# Patient Record
Sex: MALE | Race: WHITE | NOT HISPANIC OR LATINO | URBAN - METROPOLITAN AREA
[De-identification: names, ages, dates, MRNs, and addresses within clinical notes are randomized per-mention and may not be internally consistent; named-entity substitution may affect disease eponyms.]

---

## 2017-02-08 ENCOUNTER — FOLLOW UP (OUTPATIENT)
Dept: URBAN - METROPOLITAN AREA CLINIC 19 | Facility: CLINIC | Age: 62
End: 2017-02-08

## 2017-02-08 DIAGNOSIS — E11.3313: ICD-10-CM

## 2017-02-08 DIAGNOSIS — D31.31: ICD-10-CM

## 2017-02-08 DIAGNOSIS — H25.13: ICD-10-CM

## 2017-02-08 DIAGNOSIS — Z79.4: ICD-10-CM

## 2017-02-08 PROCEDURE — 92134 CPTRZ OPH DX IMG PST SGM RTA: CPT

## 2017-02-08 PROCEDURE — 67028 INJECTION EYE DRUG: CPT

## 2017-02-08 PROCEDURE — 92226 OPHTHALMOSCOPY (SUB): CPT

## 2017-02-08 PROCEDURE — 92014 COMPRE OPH EXAM EST PT 1/>: CPT | Mod: 25

## 2017-02-08 ASSESSMENT — VISUAL ACUITY
OD_SC: 20/50-1
OS_SC: 20/40+3
OS_PH: 20/25
OD_PH: 20/40

## 2017-02-08 ASSESSMENT — TONOMETRY
OD_IOP_MMHG: 19
OS_IOP_MMHG: 12

## 2017-03-12 ENCOUNTER — RX RENEWAL (OUTPATIENT)
Age: 62
End: 2017-03-12

## 2017-03-26 ENCOUNTER — RX RENEWAL (OUTPATIENT)
Age: 62
End: 2017-03-26

## 2017-03-27 ENCOUNTER — RX RENEWAL (OUTPATIENT)
Age: 62
End: 2017-03-27

## 2017-04-05 ENCOUNTER — FOLLOW UP (OUTPATIENT)
Dept: URBAN - METROPOLITAN AREA CLINIC 19 | Facility: CLINIC | Age: 62
End: 2017-04-05

## 2017-04-05 DIAGNOSIS — Z79.4: ICD-10-CM

## 2017-04-05 DIAGNOSIS — E11.3313: ICD-10-CM

## 2017-04-05 DIAGNOSIS — D31.31: ICD-10-CM

## 2017-04-05 PROCEDURE — 67028 INJECTION EYE DRUG: CPT

## 2017-04-05 PROCEDURE — 92134 CPTRZ OPH DX IMG PST SGM RTA: CPT

## 2017-04-05 PROCEDURE — 2022F DILAT RTA XM EVC RTNOPTHY: CPT

## 2017-04-05 PROCEDURE — 92014 COMPRE OPH EXAM EST PT 1/>: CPT | Mod: 25

## 2017-04-05 PROCEDURE — G8397 DIL MACULA/FUNDUS EXAM/W DOC: HCPCS

## 2017-04-05 PROCEDURE — 1036F TOBACCO NON-USER: CPT

## 2017-04-05 PROCEDURE — 92226 OPHTHALMOSCOPY (SUB): CPT

## 2017-04-05 PROCEDURE — 2021F DILAT MACULAR EXAM DONE: CPT

## 2017-04-05 PROCEDURE — 5010F MACUL RESULT PHY/QHP MNG DM: CPT

## 2017-04-05 ASSESSMENT — TONOMETRY
OS_IOP_MMHG: 15
OD_IOP_MMHG: 17

## 2017-04-05 ASSESSMENT — VISUAL ACUITY
OS_SC: 20/30
OD_SC: 20/50
OD_PH: 20/40

## 2017-04-10 ENCOUNTER — RX RENEWAL (OUTPATIENT)
Age: 62
End: 2017-04-10

## 2017-05-25 ENCOUNTER — FOLLOW UP (OUTPATIENT)
Dept: URBAN - METROPOLITAN AREA CLINIC 19 | Facility: CLINIC | Age: 62
End: 2017-05-25

## 2017-05-25 DIAGNOSIS — D31.31: ICD-10-CM

## 2017-05-25 DIAGNOSIS — Z79.4: ICD-10-CM

## 2017-05-25 DIAGNOSIS — E11.3313: ICD-10-CM

## 2017-05-25 PROCEDURE — 5010F MACUL RESULT PHY/QHP MNG DM: CPT

## 2017-05-25 PROCEDURE — 92134 CPTRZ OPH DX IMG PST SGM RTA: CPT

## 2017-05-25 PROCEDURE — 4040F PNEUMOC VAC/ADMIN/RCVD: CPT | Mod: 8P

## 2017-05-25 PROCEDURE — 92226 OPHTHALMOSCOPY (SUB): CPT

## 2017-05-25 PROCEDURE — 2021F DILAT MACULAR EXAM DONE: CPT

## 2017-05-25 PROCEDURE — 67028 INJECTION EYE DRUG: CPT

## 2017-05-25 PROCEDURE — 1036F TOBACCO NON-USER: CPT

## 2017-05-25 PROCEDURE — 2022F DILAT RTA XM EVC RTNOPTHY: CPT

## 2017-05-25 PROCEDURE — G8427 DOCREV CUR MEDS BY ELIG CLIN: HCPCS

## 2017-05-25 PROCEDURE — 92014 COMPRE OPH EXAM EST PT 1/>: CPT | Mod: 25

## 2017-05-25 PROCEDURE — G8397 DIL MACULA/FUNDUS EXAM/W DOC: HCPCS

## 2017-05-25 ASSESSMENT — VISUAL ACUITY
OD_SC: 20/40-1
OS_SC: 20/30-3
OS_PH: 20/20
OD_PH: 20/25-2

## 2017-05-25 ASSESSMENT — TONOMETRY
OD_IOP_MMHG: 15
OS_IOP_MMHG: 15

## 2017-06-02 ENCOUNTER — APPOINTMENT (OUTPATIENT)
Dept: INTERNAL MEDICINE | Facility: CLINIC | Age: 62
End: 2017-06-02

## 2017-06-02 VITALS
HEART RATE: 64 BPM | BODY MASS INDEX: 26.77 KG/M2 | WEIGHT: 202 LBS | HEIGHT: 73 IN | DIASTOLIC BLOOD PRESSURE: 73 MMHG | SYSTOLIC BLOOD PRESSURE: 124 MMHG | TEMPERATURE: 97.9 F | OXYGEN SATURATION: 96 %

## 2017-06-02 DIAGNOSIS — I10 ESSENTIAL (PRIMARY) HYPERTENSION: ICD-10-CM

## 2017-06-02 DIAGNOSIS — E11.319 TYPE 2 DIABETES MELLITUS WITH UNSPECIFIED DIABETIC RETINOPATHY W/OUT MACULAR EDEMA: ICD-10-CM

## 2017-06-02 DIAGNOSIS — L29.9 PRURITUS, UNSPECIFIED: ICD-10-CM

## 2017-06-02 LAB
BILIRUB UR QL STRIP: NEGATIVE
CLARITY UR: CLEAR
COLLECTION METHOD: NORMAL
GLUCOSE UR-MCNC: NEGATIVE
HCG UR QL: 0.2 EU/DL
HGB UR QL STRIP.AUTO: NEGATIVE
KETONES UR-MCNC: NEGATIVE
LEUKOCYTE ESTERASE UR QL STRIP: NORMAL
NITRITE UR QL STRIP: NEGATIVE
PH UR STRIP: 6
PROT UR STRIP-MCNC: NEGATIVE
SP GR UR STRIP: 1.01

## 2017-06-04 LAB
25(OH)D3 SERPL-MCNC: 36.6 NG/ML
ALBUMIN SERPL ELPH-MCNC: 4.8 G/DL
ALP BLD-CCNC: 81 U/L
ALT SERPL-CCNC: 31 U/L
ANION GAP SERPL CALC-SCNC: 15 MMOL/L
AST SERPL-CCNC: 27 U/L
BASOPHILS # BLD AUTO: 0.06 K/UL
BASOPHILS NFR BLD AUTO: 0.8 %
BILIRUB SERPL-MCNC: 0.3 MG/DL
BUN SERPL-MCNC: 33 MG/DL
CALCIUM SERPL-MCNC: 10.1 MG/DL
CHLORIDE SERPL-SCNC: 100 MMOL/L
CHOLEST SERPL-MCNC: 115 MG/DL
CHOLEST/HDLC SERPL: 2.7 RATIO
CO2 SERPL-SCNC: 27 MMOL/L
CREAT SERPL-MCNC: 1.44 MG/DL
CREAT SPEC-SCNC: 83 MG/DL
EOSINOPHIL # BLD AUTO: 0.5 K/UL
EOSINOPHIL NFR BLD AUTO: 6.5 %
GLUCOSE SERPL-MCNC: 132 MG/DL
HBA1C MFR BLD HPLC: 7.5 %
HCT VFR BLD CALC: 41.4 %
HDLC SERPL-MCNC: 43 MG/DL
HGB BLD-MCNC: 13.5 G/DL
IMM GRANULOCYTES NFR BLD AUTO: 0.1 %
LDLC SERPL CALC-MCNC: 61 MG/DL
LYMPHOCYTES # BLD AUTO: 1.95 K/UL
LYMPHOCYTES NFR BLD AUTO: 25.2 %
MAGNESIUM SERPL-MCNC: 2.3 MG/DL
MAN DIFF?: NORMAL
MCHC RBC-ENTMCNC: 31.3 PG
MCHC RBC-ENTMCNC: 32.6 GM/DL
MCV RBC AUTO: 95.8 FL
MICROALBUMIN 24H UR DL<=1MG/L-MCNC: 4 MG/DL
MICROALBUMIN/CREAT 24H UR-RTO: 48
MONOCYTES # BLD AUTO: 0.71 K/UL
MONOCYTES NFR BLD AUTO: 9.2 %
NEUTROPHILS # BLD AUTO: 4.51 K/UL
NEUTROPHILS NFR BLD AUTO: 58.2 %
PHOSPHATE SERPL-MCNC: 3.9 MG/DL
PLATELET # BLD AUTO: 288 K/UL
POTASSIUM SERPL-SCNC: 6 MMOL/L
PROT SERPL-MCNC: 7.8 G/DL
PSA SERPL-MCNC: 0.81 NG/ML
RBC # BLD: 4.32 M/UL
RBC # FLD: 13.2 %
SODIUM SERPL-SCNC: 142 MMOL/L
T4 SERPL-MCNC: 7.9 UG/DL
TRIGL SERPL-MCNC: 53 MG/DL
TSH SERPL-ACNC: 3.3 UIU/ML
URATE SERPL-MCNC: 6.4 MG/DL
WBC # FLD AUTO: 7.74 K/UL

## 2017-06-05 LAB — ALP BONE SERPL-MCNC: 11 MCG/L

## 2017-06-06 LAB — BILE AC SER-MCNC: 3.7 UMOL/L

## 2017-07-13 ENCOUNTER — FOLLOW UP (OUTPATIENT)
Dept: URBAN - METROPOLITAN AREA CLINIC 19 | Facility: CLINIC | Age: 62
End: 2017-07-13

## 2017-07-13 DIAGNOSIS — Z79.4: ICD-10-CM

## 2017-07-13 DIAGNOSIS — E11.3313: ICD-10-CM

## 2017-07-13 DIAGNOSIS — D31.31: ICD-10-CM

## 2017-07-13 PROCEDURE — 67028 INJECTION EYE DRUG: CPT

## 2017-07-13 PROCEDURE — 92014 COMPRE OPH EXAM EST PT 1/>: CPT | Mod: 25

## 2017-07-13 PROCEDURE — G8397 DIL MACULA/FUNDUS EXAM/W DOC: HCPCS

## 2017-07-13 PROCEDURE — 92134 CPTRZ OPH DX IMG PST SGM RTA: CPT

## 2017-07-13 PROCEDURE — 5010F MACUL RESULT PHY/QHP MNG DM: CPT

## 2017-07-13 PROCEDURE — 2021F DILAT MACULAR EXAM DONE: CPT

## 2017-07-13 PROCEDURE — 92226 OPHTHALMOSCOPY (SUB): CPT

## 2017-07-13 PROCEDURE — 2022F DILAT RTA XM EVC RTNOPTHY: CPT

## 2017-07-13 PROCEDURE — G8427 DOCREV CUR MEDS BY ELIG CLIN: HCPCS

## 2017-07-13 PROCEDURE — 1036F TOBACCO NON-USER: CPT

## 2017-07-13 ASSESSMENT — VISUAL ACUITY
OD_PH: 20/40
OD_SC: 20/50-3
OS_SC: 20/30-1

## 2017-07-13 ASSESSMENT — TONOMETRY
OD_IOP_MMHG: 17
OS_IOP_MMHG: 12

## 2017-08-17 ENCOUNTER — FOLLOW UP (OUTPATIENT)
Dept: URBAN - METROPOLITAN AREA CLINIC 19 | Facility: CLINIC | Age: 62
End: 2017-08-17

## 2017-08-17 DIAGNOSIS — Z79.4: ICD-10-CM

## 2017-08-17 DIAGNOSIS — E11.3313: ICD-10-CM

## 2017-08-17 DIAGNOSIS — D31.31: ICD-10-CM

## 2017-08-17 PROCEDURE — 92226 OPHTHALMOSCOPY (SUB): CPT

## 2017-08-17 PROCEDURE — G8397 DIL MACULA/FUNDUS EXAM/W DOC: HCPCS

## 2017-08-17 PROCEDURE — 92134 CPTRZ OPH DX IMG PST SGM RTA: CPT

## 2017-08-17 PROCEDURE — 2022F DILAT RTA XM EVC RTNOPTHY: CPT

## 2017-08-17 PROCEDURE — 1036F TOBACCO NON-USER: CPT

## 2017-08-17 PROCEDURE — 92014 COMPRE OPH EXAM EST PT 1/>: CPT | Mod: 25

## 2017-08-17 PROCEDURE — 2021F DILAT MACULAR EXAM DONE: CPT

## 2017-08-17 PROCEDURE — 67028 INJECTION EYE DRUG: CPT

## 2017-08-17 PROCEDURE — 5010F MACUL RESULT PHY/QHP MNG DM: CPT

## 2017-08-17 PROCEDURE — G8427 DOCREV CUR MEDS BY ELIG CLIN: HCPCS

## 2017-08-17 ASSESSMENT — VISUAL ACUITY
OD_SC: 20/80
OS_SC: 20/30
OD_PH: 20/50-1
OS_PH: 20/25

## 2017-08-17 ASSESSMENT — TONOMETRY
OD_IOP_MMHG: 12
OS_IOP_MMHG: 12

## 2017-08-26 RX ORDER — INSULIN ASPART 100 [IU]/ML
100 INJECTION, SOLUTION INTRAVENOUS; SUBCUTANEOUS
Qty: 30 | Refills: 3 | Status: ACTIVE | COMMUNITY
Start: 2017-08-26 | End: 1900-01-01

## 2017-09-11 ENCOUNTER — RX RENEWAL (OUTPATIENT)
Age: 62
End: 2017-09-11

## 2017-10-05 ENCOUNTER — FOLLOW UP (OUTPATIENT)
Dept: URBAN - METROPOLITAN AREA CLINIC 19 | Facility: CLINIC | Age: 62
End: 2017-10-05

## 2017-10-05 DIAGNOSIS — D31.31: ICD-10-CM

## 2017-10-05 DIAGNOSIS — E11.3313: ICD-10-CM

## 2017-10-05 DIAGNOSIS — Z79.4: ICD-10-CM

## 2017-10-05 PROCEDURE — 92226 OPHTHALMOSCOPY (SUB): CPT

## 2017-10-05 PROCEDURE — 92134 CPTRZ OPH DX IMG PST SGM RTA: CPT

## 2017-10-05 PROCEDURE — 92014 COMPRE OPH EXAM EST PT 1/>: CPT | Mod: 25

## 2017-10-05 PROCEDURE — 5010F MACUL RESULT PHY/QHP MNG DM: CPT

## 2017-10-05 PROCEDURE — 67028 INJECTION EYE DRUG: CPT

## 2017-10-05 PROCEDURE — G8397 DIL MACULA/FUNDUS EXAM/W DOC: HCPCS

## 2017-10-05 PROCEDURE — G8427 DOCREV CUR MEDS BY ELIG CLIN: HCPCS

## 2017-10-05 PROCEDURE — 1036F TOBACCO NON-USER: CPT

## 2017-10-05 PROCEDURE — 2022F DILAT RTA XM EVC RTNOPTHY: CPT

## 2017-10-05 PROCEDURE — 2021F DILAT MACULAR EXAM DONE: CPT

## 2017-10-05 ASSESSMENT — TONOMETRY
OD_IOP_MMHG: 16
OS_IOP_MMHG: 14

## 2017-10-05 ASSESSMENT — VISUAL ACUITY
OS_PH: 20/25
OD_SC: 20/60
OS_SC: 20/30
OD_PH: 20/50

## 2017-10-09 ENCOUNTER — RX RENEWAL (OUTPATIENT)
Age: 62
End: 2017-10-09

## 2017-10-17 ENCOUNTER — INPATIENT (INPATIENT)
Facility: HOSPITAL | Age: 62
LOS: 4 days | Discharge: HOME | DRG: 637 | End: 2017-10-22
Attending: HOSPITALIST | Admitting: INTERNAL MEDICINE
Payer: COMMERCIAL

## 2017-10-17 VITALS
TEMPERATURE: 97 F | OXYGEN SATURATION: 100 % | RESPIRATION RATE: 20 BRPM | SYSTOLIC BLOOD PRESSURE: 103 MMHG | HEART RATE: 81 BPM | DIASTOLIC BLOOD PRESSURE: 54 MMHG

## 2017-10-17 DIAGNOSIS — E87.5 HYPERKALEMIA: ICD-10-CM

## 2017-10-17 DIAGNOSIS — I10 ESSENTIAL (PRIMARY) HYPERTENSION: ICD-10-CM

## 2017-10-17 DIAGNOSIS — E13.10 OTHER SPECIFIED DIABETES MELLITUS WITH KETOACIDOSIS WITHOUT COMA: ICD-10-CM

## 2017-10-17 DIAGNOSIS — E03.9 HYPOTHYROIDISM, UNSPECIFIED: ICD-10-CM

## 2017-10-17 DIAGNOSIS — E10.10 TYPE 1 DIABETES MELLITUS WITH KETOACIDOSIS WITHOUT COMA: ICD-10-CM

## 2017-10-17 DIAGNOSIS — E78.5 HYPERLIPIDEMIA, UNSPECIFIED: ICD-10-CM

## 2017-10-17 DIAGNOSIS — D72.829 ELEVATED WHITE BLOOD CELL COUNT, UNSPECIFIED: ICD-10-CM

## 2017-10-17 DIAGNOSIS — E86.0 DEHYDRATION: ICD-10-CM

## 2017-10-17 DIAGNOSIS — N17.9 ACUTE KIDNEY FAILURE, UNSPECIFIED: ICD-10-CM

## 2017-10-17 DIAGNOSIS — Z29.9 ENCOUNTER FOR PROPHYLACTIC MEASURES, UNSPECIFIED: ICD-10-CM

## 2017-10-17 LAB
ALBUMIN SERPL ELPH-MCNC: 4.2 G/DL — SIGNIFICANT CHANGE UP (ref 3.3–5)
ALP SERPL-CCNC: 75 U/L — SIGNIFICANT CHANGE UP (ref 40–120)
ALT FLD-CCNC: 30 U/L RC — SIGNIFICANT CHANGE UP (ref 10–45)
ANION GAP SERPL CALC-SCNC: 25 MMOL/L — HIGH (ref 5–17)
ANION GAP SERPL CALC-SCNC: 35 MMOL/L — HIGH (ref 5–17)
ANION GAP SERPL CALC-SCNC: 41 MMOL/L — HIGH (ref 5–17)
ANISOCYTOSIS BLD QL: SLIGHT — SIGNIFICANT CHANGE UP
APPEARANCE UR: CLEAR — SIGNIFICANT CHANGE UP
APTT BLD: 26.3 SEC — LOW (ref 27.5–37.4)
AST SERPL-CCNC: 37 U/L — SIGNIFICANT CHANGE UP (ref 10–40)
BASE EXCESS BLDV CALC-SCNC: -10.8 MMOL/L — LOW (ref -2–2)
BASE EXCESS BLDV CALC-SCNC: -12.2 MMOL/L — LOW (ref -2–2)
BASE EXCESS BLDV CALC-SCNC: -14.3 MMOL/L — LOW (ref -2–2)
BASE EXCESS BLDV CALC-SCNC: -16.3 MMOL/L — LOW (ref -2–2)
BASE EXCESS BLDV CALC-SCNC: -18.4 MMOL/L — LOW (ref -2–2)
BASE EXCESS BLDV CALC-SCNC: -18.4 MMOL/L — LOW (ref -2–2)
BASE EXCESS BLDV CALC-SCNC: -23 MMOL/L — LOW (ref -2–2)
BASE EXCESS BLDV CALC-SCNC: -25.2 MMOL/L — LOW (ref -2–2)
BASE EXCESS BLDV CALC-SCNC: -25.5 MMOL/L — LOW (ref -2–2)
BASE EXCESS BLDV CALC-SCNC: -9.2 MMOL/L — LOW (ref -2–2)
BASOPHILS # BLD AUTO: 0.1 K/UL — SIGNIFICANT CHANGE UP (ref 0–0.2)
BASOPHILS NFR BLD AUTO: 0 % — SIGNIFICANT CHANGE UP (ref 0–2)
BILIRUB SERPL-MCNC: 0.3 MG/DL — SIGNIFICANT CHANGE UP (ref 0.2–1.2)
BILIRUB UR-MCNC: NEGATIVE — SIGNIFICANT CHANGE UP
BUN SERPL-MCNC: 70 MG/DL — HIGH (ref 7–23)
BUN SERPL-MCNC: 72 MG/DL — HIGH (ref 7–23)
BUN SERPL-MCNC: 76 MG/DL — HIGH (ref 7–23)
CA-I SERPL-SCNC: 1.14 MMOL/L — SIGNIFICANT CHANGE UP (ref 1.12–1.3)
CA-I SERPL-SCNC: 1.15 MMOL/L — SIGNIFICANT CHANGE UP (ref 1.12–1.3)
CA-I SERPL-SCNC: 1.15 MMOL/L — SIGNIFICANT CHANGE UP (ref 1.12–1.3)
CA-I SERPL-SCNC: 1.17 MMOL/L — SIGNIFICANT CHANGE UP (ref 1.12–1.3)
CA-I SERPL-SCNC: 1.18 MMOL/L — SIGNIFICANT CHANGE UP (ref 1.12–1.3)
CA-I SERPL-SCNC: 1.19 MMOL/L — SIGNIFICANT CHANGE UP (ref 1.12–1.3)
CA-I SERPL-SCNC: 1.23 MMOL/L — SIGNIFICANT CHANGE UP (ref 1.12–1.3)
CALCIUM SERPL-MCNC: 7.7 MG/DL — LOW (ref 8.4–10.5)
CALCIUM SERPL-MCNC: 7.8 MG/DL — LOW (ref 8.4–10.5)
CALCIUM SERPL-MCNC: 8.5 MG/DL — SIGNIFICANT CHANGE UP (ref 8.4–10.5)
CHLORIDE BLDV-SCNC: 102 MMOL/L — SIGNIFICANT CHANGE UP (ref 96–108)
CHLORIDE BLDV-SCNC: 103 MMOL/L — SIGNIFICANT CHANGE UP (ref 96–108)
CHLORIDE BLDV-SCNC: 105 MMOL/L — SIGNIFICANT CHANGE UP (ref 96–108)
CHLORIDE BLDV-SCNC: 105 MMOL/L — SIGNIFICANT CHANGE UP (ref 96–108)
CHLORIDE BLDV-SCNC: 95 MMOL/L — LOW (ref 96–108)
CHLORIDE SERPL-SCNC: 82 MMOL/L — LOW (ref 96–108)
CHLORIDE SERPL-SCNC: 90 MMOL/L — LOW (ref 96–108)
CHLORIDE SERPL-SCNC: 95 MMOL/L — LOW (ref 96–108)
CK MB BLD-MCNC: 2.9 % — SIGNIFICANT CHANGE UP (ref 0–3.5)
CK MB CFR SERPL CALC: 20.9 NG/ML — HIGH (ref 0–6.7)
CK SERPL-CCNC: 726 U/L — HIGH (ref 30–200)
CO2 BLDV-SCNC: 11 MMOL/L — LOW (ref 22–30)
CO2 BLDV-SCNC: 11 MMOL/L — LOW (ref 22–30)
CO2 BLDV-SCNC: 12 MMOL/L — LOW (ref 22–30)
CO2 BLDV-SCNC: 14 MMOL/L — LOW (ref 22–30)
CO2 BLDV-SCNC: 16 MMOL/L — LOW (ref 22–30)
CO2 BLDV-SCNC: 17 MMOL/L — LOW (ref 22–30)
CO2 BLDV-SCNC: 18 MMOL/L — LOW (ref 22–30)
CO2 BLDV-SCNC: 6 MMOL/L — LOW (ref 22–30)
CO2 BLDV-SCNC: 7 MMOL/L — LOW (ref 22–30)
CO2 BLDV-SCNC: 8 MMOL/L — LOW (ref 22–30)
CO2 SERPL-SCNC: 13 MMOL/L — LOW (ref 22–31)
CO2 SERPL-SCNC: 5 MMOL/L — CRITICAL LOW (ref 22–31)
CO2 SERPL-SCNC: 9 MMOL/L — CRITICAL LOW (ref 22–31)
COLOR SPEC: YELLOW — SIGNIFICANT CHANGE UP
CREAT SERPL-MCNC: 3.75 MG/DL — HIGH (ref 0.5–1.3)
CREAT SERPL-MCNC: 3.79 MG/DL — HIGH (ref 0.5–1.3)
CREAT SERPL-MCNC: 3.81 MG/DL — HIGH (ref 0.5–1.3)
DIFF PNL FLD: ABNORMAL
EOSINOPHIL # BLD AUTO: 0.1 K/UL — SIGNIFICANT CHANGE UP (ref 0–0.5)
EOSINOPHIL NFR BLD AUTO: 0 % — SIGNIFICANT CHANGE UP (ref 0–6)
ETHANOL SERPL-MCNC: SIGNIFICANT CHANGE UP MG/DL (ref 0–10)
GAS PNL BLDV: 125 MMOL/L — LOW (ref 136–145)
GAS PNL BLDV: 129 MMOL/L — LOW (ref 136–145)
GAS PNL BLDV: 130 MMOL/L — LOW (ref 136–145)
GAS PNL BLDV: 131 MMOL/L — LOW (ref 136–145)
GAS PNL BLDV: 132 MMOL/L — LOW (ref 136–145)
GAS PNL BLDV: SIGNIFICANT CHANGE UP
GLUCOSE BLDC GLUCOMTR-MCNC: >600 MG/DL — CRITICAL HIGH (ref 70–99)
GLUCOSE BLDV-MCNC: 608 MG/DL — CRITICAL HIGH (ref 70–99)
GLUCOSE BLDV-MCNC: 672 MG/DL — CRITICAL HIGH (ref 70–99)
GLUCOSE BLDV-MCNC: 767 MG/DL — CRITICAL HIGH (ref 70–99)
GLUCOSE BLDV-MCNC: 797 MG/DL — CRITICAL HIGH (ref 70–99)
GLUCOSE BLDV-MCNC: 843 MG/DL — CRITICAL HIGH (ref 70–99)
GLUCOSE BLDV-MCNC: 845 MG/DL — CRITICAL HIGH (ref 70–99)
GLUCOSE BLDV-MCNC: 874 MG/DL — CRITICAL HIGH (ref 70–99)
GLUCOSE BLDV-MCNC: 906 MG/DL — CRITICAL HIGH (ref 70–99)
GLUCOSE BLDV-MCNC: 922 MG/DL — CRITICAL HIGH (ref 70–99)
GLUCOSE SERPL-MCNC: 1003 MG/DL — CRITICAL HIGH (ref 70–99)
GLUCOSE SERPL-MCNC: 1190 MG/DL — CRITICAL HIGH (ref 70–99)
GLUCOSE SERPL-MCNC: 811 MG/DL — CRITICAL HIGH (ref 70–99)
GLUCOSE UR QL: >1000
HBA1C BLD-MCNC: 7.7 % — HIGH (ref 4–5.6)
HCO3 BLDV-SCNC: 10 MMOL/L — LOW (ref 21–29)
HCO3 BLDV-SCNC: 10 MMOL/L — LOW (ref 21–29)
HCO3 BLDV-SCNC: 11 MMOL/L — LOW (ref 21–29)
HCO3 BLDV-SCNC: 13 MMOL/L — LOW (ref 21–29)
HCO3 BLDV-SCNC: 14 MMOL/L — LOW (ref 21–29)
HCO3 BLDV-SCNC: 16 MMOL/L — LOW (ref 21–29)
HCO3 BLDV-SCNC: 17 MMOL/L — LOW (ref 21–29)
HCO3 BLDV-SCNC: 6 MMOL/L — LOW (ref 21–29)
HCO3 BLDV-SCNC: 6 MMOL/L — LOW (ref 21–29)
HCO3 BLDV-SCNC: 7 MMOL/L — LOW (ref 21–29)
HCT VFR BLD CALC: 37.6 % — LOW (ref 39–50)
HCT VFR BLD CALC: 39.9 % — SIGNIFICANT CHANGE UP (ref 39–50)
HCT VFR BLDA CALC: 35 % — LOW (ref 39–50)
HCT VFR BLDA CALC: 36 % — LOW (ref 39–50)
HCT VFR BLDA CALC: 37 % — LOW (ref 39–50)
HCT VFR BLDA CALC: 37 % — LOW (ref 39–50)
HCT VFR BLDA CALC: 38 % — LOW (ref 39–50)
HGB BLD CALC-MCNC: 11.3 G/DL — LOW (ref 13–17)
HGB BLD CALC-MCNC: 11.3 G/DL — LOW (ref 13–17)
HGB BLD CALC-MCNC: 11.4 G/DL — LOW (ref 13–17)
HGB BLD CALC-MCNC: 11.4 G/DL — LOW (ref 13–17)
HGB BLD CALC-MCNC: 11.5 G/DL — LOW (ref 13–17)
HGB BLD CALC-MCNC: 11.6 G/DL — LOW (ref 13–17)
HGB BLD CALC-MCNC: 11.9 G/DL — LOW (ref 13–17)
HGB BLD CALC-MCNC: 11.9 G/DL — LOW (ref 13–17)
HGB BLD CALC-MCNC: 12.4 G/DL — LOW (ref 13–17)
HGB BLD-MCNC: 12.1 G/DL — LOW (ref 13–17)
HGB BLD-MCNC: 12.6 G/DL — LOW (ref 13–17)
HOROWITZ INDEX BLDV+IHG-RTO: 21 — SIGNIFICANT CHANGE UP
HYPOCHROMIA BLD QL: SLIGHT — SIGNIFICANT CHANGE UP
INR BLD: 1.25 RATIO — HIGH (ref 0.88–1.16)
INR BLD: 1.27 RATIO — HIGH (ref 0.88–1.16)
KETONES UR-MCNC: ABNORMAL
LACTATE BLDV-MCNC: 2.2 MMOL/L — HIGH (ref 0.7–2)
LACTATE BLDV-MCNC: 2.4 MMOL/L — HIGH (ref 0.7–2)
LACTATE BLDV-MCNC: 2.5 MMOL/L — HIGH (ref 0.7–2)
LACTATE BLDV-MCNC: 2.5 MMOL/L — HIGH (ref 0.7–2)
LACTATE BLDV-MCNC: 2.7 MMOL/L — HIGH (ref 0.7–2)
LACTATE BLDV-MCNC: 3.2 MMOL/L — HIGH (ref 0.7–2)
LACTATE BLDV-MCNC: 3.9 MMOL/L — HIGH (ref 0.7–2)
LACTATE BLDV-MCNC: 4.9 MMOL/L — CRITICAL HIGH (ref 0.7–2)
LACTATE BLDV-MCNC: 6.2 MMOL/L — CRITICAL HIGH (ref 0.7–2)
LEUKOCYTE ESTERASE UR-ACNC: NEGATIVE — SIGNIFICANT CHANGE UP
LYMPHOCYTES # BLD AUTO: 2.1 K/UL — SIGNIFICANT CHANGE UP (ref 1–3.3)
LYMPHOCYTES # BLD AUTO: 5 % — LOW (ref 13–44)
MACROCYTES BLD QL: SIGNIFICANT CHANGE UP
MAGNESIUM SERPL-MCNC: 2.1 MG/DL — SIGNIFICANT CHANGE UP (ref 1.6–2.6)
MAGNESIUM SERPL-MCNC: 2.2 MG/DL — SIGNIFICANT CHANGE UP (ref 1.6–2.6)
MCHC RBC-ENTMCNC: 31.7 GM/DL — LOW (ref 32–36)
MCHC RBC-ENTMCNC: 32.2 GM/DL — SIGNIFICANT CHANGE UP (ref 32–36)
MCHC RBC-ENTMCNC: 33.4 PG — SIGNIFICANT CHANGE UP (ref 27–34)
MCHC RBC-ENTMCNC: 34 PG — SIGNIFICANT CHANGE UP (ref 27–34)
MCV RBC AUTO: 104 FL — HIGH (ref 80–100)
MCV RBC AUTO: 107 FL — HIGH (ref 80–100)
METAMYELOCYTES # FLD: 1 % — HIGH (ref 0–0)
MONOCYTES # BLD AUTO: 2.8 K/UL — HIGH (ref 0–0.9)
MONOCYTES NFR BLD AUTO: 11 % — SIGNIFICANT CHANGE UP (ref 2–14)
MYELOCYTES NFR BLD: 1 % — HIGH (ref 0–0)
NEUTROPHILS # BLD AUTO: 29.1 K/UL — HIGH (ref 1.8–7.4)
NEUTROPHILS NFR BLD AUTO: 78 % — HIGH (ref 43–77)
NEUTS BAND # BLD: 4 % — SIGNIFICANT CHANGE UP (ref 0–8)
NITRITE UR-MCNC: NEGATIVE — SIGNIFICANT CHANGE UP
OTHER CELLS CSF MANUAL: 10 ML/DL — LOW (ref 18–22)
OTHER CELLS CSF MANUAL: 11 ML/DL — LOW (ref 18–22)
OTHER CELLS CSF MANUAL: 12 ML/DL — LOW (ref 18–22)
OTHER CELLS CSF MANUAL: 12 ML/DL — LOW (ref 18–22)
OTHER CELLS CSF MANUAL: 13 ML/DL — LOW (ref 18–22)
PCO2 BLDV: 24 MMHG — LOW (ref 35–50)
PCO2 BLDV: 30 MMHG — LOW (ref 35–50)
PCO2 BLDV: 31 MMHG — LOW (ref 35–50)
PCO2 BLDV: 34 MMHG — LOW (ref 35–50)
PCO2 BLDV: 36 MMHG — SIGNIFICANT CHANGE UP (ref 35–50)
PCO2 BLDV: 37 MMHG — SIGNIFICANT CHANGE UP (ref 35–50)
PCO2 BLDV: 38 MMHG — SIGNIFICANT CHANGE UP (ref 35–50)
PCO2 BLDV: 39 MMHG — SIGNIFICANT CHANGE UP (ref 35–50)
PH BLDV: 6.94 — CRITICAL LOW (ref 7.35–7.45)
PH BLDV: 6.99 — CRITICAL LOW (ref 7.35–7.45)
PH BLDV: 7.02 — CRITICAL LOW (ref 7.35–7.45)
PH BLDV: 7.11 — CRITICAL LOW (ref 7.35–7.45)
PH BLDV: 7.11 — CRITICAL LOW (ref 7.35–7.45)
PH BLDV: 7.15 — CRITICAL LOW (ref 7.35–7.45)
PH BLDV: 7.18 — CRITICAL LOW (ref 7.35–7.45)
PH BLDV: 7.21 — LOW (ref 7.35–7.45)
PH BLDV: 7.23 — LOW (ref 7.35–7.45)
PH BLDV: 7.26 — LOW (ref 7.35–7.45)
PH UR: 5.5 — SIGNIFICANT CHANGE UP (ref 5–8)
PHOSPHATE SERPL-MCNC: 3.6 MG/DL — SIGNIFICANT CHANGE UP (ref 2.5–4.5)
PHOSPHATE SERPL-MCNC: 6.2 MG/DL — HIGH (ref 2.5–4.5)
PLAT MORPH BLD: NORMAL — SIGNIFICANT CHANGE UP
PLATELET # BLD AUTO: 325 K/UL — SIGNIFICANT CHANGE UP (ref 150–400)
PLATELET # BLD AUTO: 349 K/UL — SIGNIFICANT CHANGE UP (ref 150–400)
PO2 BLDV: 32 MMHG — SIGNIFICANT CHANGE UP (ref 25–45)
PO2 BLDV: 32 MMHG — SIGNIFICANT CHANGE UP (ref 25–45)
PO2 BLDV: 36 MMHG — SIGNIFICANT CHANGE UP (ref 25–45)
PO2 BLDV: 37 MMHG — SIGNIFICANT CHANGE UP (ref 25–45)
PO2 BLDV: 39 MMHG — SIGNIFICANT CHANGE UP (ref 25–45)
PO2 BLDV: 42 MMHG — SIGNIFICANT CHANGE UP (ref 25–45)
PO2 BLDV: 49 MMHG — HIGH (ref 25–45)
PO2 BLDV: 51 MMHG — HIGH (ref 25–45)
PO2 BLDV: 51 MMHG — HIGH (ref 25–45)
PO2 BLDV: 65 MMHG — HIGH (ref 25–45)
POIKILOCYTOSIS BLD QL AUTO: SLIGHT — SIGNIFICANT CHANGE UP
POLYCHROMASIA BLD QL SMEAR: SLIGHT — SIGNIFICANT CHANGE UP
POTASSIUM BLDV-SCNC: 3.9 MMOL/L — SIGNIFICANT CHANGE UP (ref 3.5–5)
POTASSIUM BLDV-SCNC: 4.1 MMOL/L — SIGNIFICANT CHANGE UP (ref 3.5–5)
POTASSIUM BLDV-SCNC: 4.2 MMOL/L — SIGNIFICANT CHANGE UP (ref 3.5–5)
POTASSIUM BLDV-SCNC: 4.7 MMOL/L — SIGNIFICANT CHANGE UP (ref 3.5–5)
POTASSIUM BLDV-SCNC: 5.1 MMOL/L — HIGH (ref 3.5–5)
POTASSIUM BLDV-SCNC: 5.2 MMOL/L — HIGH (ref 3.5–5)
POTASSIUM BLDV-SCNC: 5.3 MMOL/L — HIGH (ref 3.5–5)
POTASSIUM BLDV-SCNC: 5.7 MMOL/L — HIGH (ref 3.5–5)
POTASSIUM BLDV-SCNC: 7.2 MMOL/L — CRITICAL HIGH (ref 3.5–5)
POTASSIUM SERPL-MCNC: 4.5 MMOL/L — SIGNIFICANT CHANGE UP (ref 3.5–5.3)
POTASSIUM SERPL-MCNC: 5.5 MMOL/L — HIGH (ref 3.5–5.3)
POTASSIUM SERPL-MCNC: 8 MMOL/L — CRITICAL HIGH (ref 3.5–5.3)
POTASSIUM SERPL-SCNC: 4.5 MMOL/L — SIGNIFICANT CHANGE UP (ref 3.5–5.3)
POTASSIUM SERPL-SCNC: 5.5 MMOL/L — HIGH (ref 3.5–5.3)
POTASSIUM SERPL-SCNC: 8 MMOL/L — CRITICAL HIGH (ref 3.5–5.3)
PROCALCITONIN SERPL-MCNC: 8.36 NG/ML — HIGH (ref 0–0.04)
PROT SERPL-MCNC: 6.5 G/DL — SIGNIFICANT CHANGE UP (ref 6–8.3)
PROT UR-MCNC: SIGNIFICANT CHANGE UP
PROTHROM AB SERPL-ACNC: 13.6 SEC — HIGH (ref 9.8–12.7)
PROTHROM AB SERPL-ACNC: 13.8 SEC — HIGH (ref 9.8–12.7)
RAPID RVP RESULT: SIGNIFICANT CHANGE UP
RBC # BLD: 3.62 M/UL — LOW (ref 4.2–5.8)
RBC # BLD: 3.72 M/UL — LOW (ref 4.2–5.8)
RBC # FLD: 11.8 % — SIGNIFICANT CHANGE UP (ref 10.3–14.5)
RBC # FLD: 11.8 % — SIGNIFICANT CHANGE UP (ref 10.3–14.5)
RBC BLD AUTO: ABNORMAL
RBC CASTS # UR COMP ASSIST: ABNORMAL /HPF (ref 0–2)
SAO2 % BLDV: 60 % — LOW (ref 67–88)
SAO2 % BLDV: 62 % — LOW (ref 67–88)
SAO2 % BLDV: 66 % — LOW (ref 67–88)
SAO2 % BLDV: 67 % — SIGNIFICANT CHANGE UP (ref 67–88)
SAO2 % BLDV: 68 % — SIGNIFICANT CHANGE UP (ref 67–88)
SAO2 % BLDV: 73 % — SIGNIFICANT CHANGE UP (ref 67–88)
SAO2 % BLDV: 75 % — SIGNIFICANT CHANGE UP (ref 67–88)
SAO2 % BLDV: 81 % — SIGNIFICANT CHANGE UP (ref 67–88)
SAO2 % BLDV: 81 % — SIGNIFICANT CHANGE UP (ref 67–88)
SAO2 % BLDV: 85 % — SIGNIFICANT CHANGE UP (ref 67–88)
SODIUM SERPL-SCNC: 128 MMOL/L — LOW (ref 135–145)
SODIUM SERPL-SCNC: 133 MMOL/L — LOW (ref 135–145)
SODIUM SERPL-SCNC: 134 MMOL/L — LOW (ref 135–145)
SP GR SPEC: 1.02 — SIGNIFICANT CHANGE UP (ref 1.01–1.02)
TOXIC GRANULES BLD QL SMEAR: PRESENT — SIGNIFICANT CHANGE UP
TROPONIN T SERPL-MCNC: 0.19 NG/ML — HIGH (ref 0–0.06)
TSH SERPL-MCNC: 2.43 UIU/ML — SIGNIFICANT CHANGE UP (ref 0.27–4.2)
UROBILINOGEN FLD QL: NEGATIVE — SIGNIFICANT CHANGE UP
WBC # BLD: 34.1 K/UL — HIGH (ref 3.8–10.5)
WBC # BLD: 34.3 K/UL — HIGH (ref 3.8–10.5)
WBC # FLD AUTO: 34.1 K/UL — HIGH (ref 3.8–10.5)
WBC # FLD AUTO: 34.3 K/UL — HIGH (ref 3.8–10.5)
WBC UR QL: ABNORMAL /HPF (ref 0–5)

## 2017-10-17 PROCEDURE — 71010: CPT | Mod: 26

## 2017-10-17 PROCEDURE — 74176 CT ABD & PELVIS W/O CONTRAST: CPT | Mod: 26

## 2017-10-17 PROCEDURE — 99255 IP/OBS CONSLTJ NEW/EST HI 80: CPT | Mod: GC

## 2017-10-17 PROCEDURE — 70450 CT HEAD/BRAIN W/O DYE: CPT | Mod: 26

## 2017-10-17 PROCEDURE — 99285 EMERGENCY DEPT VISIT HI MDM: CPT

## 2017-10-17 PROCEDURE — 99291 CRITICAL CARE FIRST HOUR: CPT

## 2017-10-17 RX ORDER — HEPARIN SODIUM 5000 [USP'U]/ML
5000 INJECTION INTRAVENOUS; SUBCUTANEOUS EVERY 8 HOURS
Qty: 0 | Refills: 0 | Status: DISCONTINUED | OUTPATIENT
Start: 2017-10-17 | End: 2017-10-22

## 2017-10-17 RX ORDER — PANTOPRAZOLE SODIUM 20 MG/1
40 TABLET, DELAYED RELEASE ORAL DAILY
Qty: 0 | Refills: 0 | Status: DISCONTINUED | OUTPATIENT
Start: 2017-10-17 | End: 2017-10-19

## 2017-10-17 RX ORDER — LEVOTHYROXINE SODIUM 125 MCG
44 TABLET ORAL DAILY
Qty: 0 | Refills: 0 | Status: DISCONTINUED | OUTPATIENT
Start: 2017-10-17 | End: 2017-10-19

## 2017-10-17 RX ORDER — SODIUM CHLORIDE 9 MG/ML
2000 INJECTION INTRAMUSCULAR; INTRAVENOUS; SUBCUTANEOUS ONCE
Qty: 0 | Refills: 0 | Status: COMPLETED | OUTPATIENT
Start: 2017-10-17 | End: 2017-10-17

## 2017-10-17 RX ORDER — SODIUM CHLORIDE 9 MG/ML
1000 INJECTION INTRAMUSCULAR; INTRAVENOUS; SUBCUTANEOUS ONCE
Qty: 0 | Refills: 0 | Status: COMPLETED | OUTPATIENT
Start: 2017-10-17 | End: 2017-10-17

## 2017-10-17 RX ORDER — SODIUM CHLORIDE 9 MG/ML
1000 INJECTION INTRAMUSCULAR; INTRAVENOUS; SUBCUTANEOUS
Qty: 0 | Refills: 0 | Status: DISCONTINUED | OUTPATIENT
Start: 2017-10-17 | End: 2017-10-18

## 2017-10-17 RX ORDER — CALCIUM GLUCONATE 100 MG/ML
1 VIAL (ML) INTRAVENOUS ONCE
Qty: 0 | Refills: 0 | Status: COMPLETED | OUTPATIENT
Start: 2017-10-17 | End: 2017-10-17

## 2017-10-17 RX ORDER — LEVOTHYROXINE SODIUM 125 MCG
88 TABLET ORAL DAILY
Qty: 0 | Refills: 0 | Status: DISCONTINUED | OUTPATIENT
Start: 2017-10-17 | End: 2017-10-17

## 2017-10-17 RX ORDER — INSULIN HUMAN 100 [IU]/ML
10 INJECTION, SOLUTION SUBCUTANEOUS ONCE
Qty: 0 | Refills: 0 | Status: COMPLETED | OUTPATIENT
Start: 2017-10-17 | End: 2017-10-17

## 2017-10-17 RX ORDER — ATORVASTATIN CALCIUM 80 MG/1
20 TABLET, FILM COATED ORAL AT BEDTIME
Qty: 0 | Refills: 0 | Status: DISCONTINUED | OUTPATIENT
Start: 2017-10-17 | End: 2017-10-18

## 2017-10-17 RX ORDER — PIPERACILLIN AND TAZOBACTAM 4; .5 G/20ML; G/20ML
3.38 INJECTION, POWDER, LYOPHILIZED, FOR SOLUTION INTRAVENOUS EVERY 12 HOURS
Qty: 0 | Refills: 0 | Status: DISCONTINUED | OUTPATIENT
Start: 2017-10-17 | End: 2017-10-18

## 2017-10-17 RX ORDER — VANCOMYCIN HCL 1 G
1000 VIAL (EA) INTRAVENOUS ONCE
Qty: 0 | Refills: 0 | Status: COMPLETED | OUTPATIENT
Start: 2017-10-17 | End: 2017-10-18

## 2017-10-17 RX ORDER — INSULIN HUMAN 100 [IU]/ML
8 INJECTION, SOLUTION SUBCUTANEOUS
Qty: 100 | Refills: 0 | Status: DISCONTINUED | OUTPATIENT
Start: 2017-10-17 | End: 2017-10-18

## 2017-10-17 RX ORDER — ASPIRIN/CALCIUM CARB/MAGNESIUM 324 MG
81 TABLET ORAL DAILY
Qty: 0 | Refills: 0 | Status: DISCONTINUED | OUTPATIENT
Start: 2017-10-17 | End: 2017-10-19

## 2017-10-17 RX ADMIN — PIPERACILLIN AND TAZOBACTAM 25 GRAM(S): 4; .5 INJECTION, POWDER, LYOPHILIZED, FOR SOLUTION INTRAVENOUS at 18:41

## 2017-10-17 RX ADMIN — INSULIN HUMAN 10 UNIT(S): 100 INJECTION, SOLUTION SUBCUTANEOUS at 20:38

## 2017-10-17 RX ADMIN — SODIUM CHLORIDE 2000 MILLILITER(S): 9 INJECTION INTRAMUSCULAR; INTRAVENOUS; SUBCUTANEOUS at 10:38

## 2017-10-17 RX ADMIN — SODIUM CHLORIDE 2000 MILLILITER(S): 9 INJECTION INTRAMUSCULAR; INTRAVENOUS; SUBCUTANEOUS at 13:06

## 2017-10-17 RX ADMIN — INSULIN HUMAN 10 UNIT(S): 100 INJECTION, SOLUTION SUBCUTANEOUS at 19:23

## 2017-10-17 RX ADMIN — SODIUM CHLORIDE 500 MILLILITER(S): 9 INJECTION INTRAMUSCULAR; INTRAVENOUS; SUBCUTANEOUS at 13:42

## 2017-10-17 RX ADMIN — INSULIN HUMAN 30 UNIT(S)/HR: 100 INJECTION, SOLUTION SUBCUTANEOUS at 21:47

## 2017-10-17 RX ADMIN — SODIUM CHLORIDE 2000 MILLILITER(S): 9 INJECTION INTRAMUSCULAR; INTRAVENOUS; SUBCUTANEOUS at 11:19

## 2017-10-17 RX ADMIN — Medication 200 GRAM(S): at 18:41

## 2017-10-17 RX ADMIN — INSULIN HUMAN 10 UNIT(S): 100 INJECTION, SOLUTION SUBCUTANEOUS at 16:59

## 2017-10-17 RX ADMIN — INSULIN HUMAN 10 UNIT(S)/HR: 100 INJECTION, SOLUTION SUBCUTANEOUS at 14:14

## 2017-10-17 RX ADMIN — INSULIN HUMAN 10 UNIT(S): 100 INJECTION, SOLUTION SUBCUTANEOUS at 11:18

## 2017-10-17 RX ADMIN — HEPARIN SODIUM 5000 UNIT(S): 5000 INJECTION INTRAVENOUS; SUBCUTANEOUS at 21:46

## 2017-10-17 RX ADMIN — INSULIN HUMAN 6 UNIT(S)/HR: 100 INJECTION, SOLUTION SUBCUTANEOUS at 11:30

## 2017-10-17 NOTE — H&P ADULT - ATTENDING COMMENTS
62 M PMHx HTN, DM1 admitted with DKA, hyperkalemia, LEONARD, dehydration. Insulin drip, ivf per protocol. Trend labs, monitor AG. Keep NPO for now. Check amylase, lipase, lipid profile, UA. No evidence of infection, hold off on abx for now. Patient is critically ill. 62 M PMHx HTN, DM1 admitted with DKA, hyperkalemia, LEONARD, dehydration. Insulin drip, ivf per protocol. Trend labs, monitor AG. Keep NPO for now. Check amylase, lipase, lipid profile, UA, UCx, BCx. Likely abdominal sepsis, check CT A/P, start empiric zosyn. Patient is critically ill.

## 2017-10-17 NOTE — ED ADULT NURSE REASSESSMENT NOTE - NS ED NURSE REASSESS COMMENT FT1
PT finger stick repeated, pt having Kussmaul breathing and restless in bed. vital signs stable. pt repositioned in bed for comfort.

## 2017-10-17 NOTE — ED ADULT NURSE NOTE - OBJECTIVE STATEMENT
62 y.o male presents to the ed 62 y.o male presents to the ed biba from home for high sugar, vomiting and AMS. wife found patient this morning, "not making sense". hx of htn, dmtype 2 and hypothyroidism, hx of DKA and high sugar episodes. wife brought his insulin pump, pt is supposed to be wearing this pump pt states "the pump is " however wife states the pump is "okay". pt went to jury duty yesterday, thought his pump  and took it off. had texted his wife and said his sugar was high, took insulin at home per daughter. pt is unclear about insulin.  pt is A&Ox3, drowsy but able to follow commands and answering questions. pursed lipped breathing, abdomen is soft nt/nd, was given 8mg of zofran by ems and one liter of fluid. finger stick was "high". two large bore Ivs placed and fluids started per order, medications given per order.

## 2017-10-17 NOTE — CONSULT NOTE ADULT - PROBLEM SELECTOR RECOMMENDATION 4
-holding enalapril in setting of LEONARD. (pt denies hx of CKD) Likely dehyrated  -holding amlodopine with currently low BP.  -monitor BP

## 2017-10-17 NOTE — CONSULT NOTE ADULT - ASSESSMENT
Pt is a 62 year old M w/ a PMH of type 1 DM w/ retinopathy. HTN, and HLD that presents with hyperglycemia. Found to be severely acidotic with hyperglycemia. Also with elevated lactate and WBC with no clear source of infection. Admitted to MICU for DKA.

## 2017-10-17 NOTE — H&P ADULT - NSHPSOCIALHISTORY_GEN_ALL_CORE
Pt states that he used to smoke marijuana but stopped 6 years ago.   He also used to drink a moderate amount, but also stopped years ago.   He works as a  and lives with his wife. He is independent of all ADLs.

## 2017-10-17 NOTE — H&P ADULT - PROBLEM SELECTOR PLAN 1
- Pt given bolus and started on insulin gtt.   - Monitor FS q1.   - monitor BMP q4  - NPO for now.   - Monitor anion gap q 4  - Check A1C in AM

## 2017-10-17 NOTE — H&P ADULT - PROBLEM SELECTOR PLAN 3
- 2/2 dehydration in the setting of DKA  - Start IV F at 500 cc /hr  - monitor Cr  - Avoid nephrotoxins.

## 2017-10-17 NOTE — H&P ADULT - PROBLEM SELECTOR PLAN 2
- Maybe reactive in the setting of DKA and acidosis, however, pt could also have septic component to presentation given elevated lactate.   - monitor for fevers  - check blood cultures and UA.   - Hold off on abx for now.

## 2017-10-17 NOTE — H&P ADULT - PROBLEM SELECTOR PLAN 4
- Relative hyperkalemia 2/2 acidosis and DKA  - Expect that at acidosis gets better K will improve and be shifted intracellularly  - Monitor K, Check EKG  - Hold of on K lowering medications at this time.   - BMP q 4

## 2017-10-17 NOTE — H&P ADULT - HISTORY OF PRESENT ILLNESS
Pt is a 62 year old M w/ a PMH of type 1 DM, HTN, and HLD that presents with hyperglycemia. As per the patient he is usually managed well on a insulin pump that is controlled by a external controller that tells him when his pump is empty. 2 days PTA misplaced his controller while visiting [place].  He attempted to give himself a bolus of insulin at night, however, in the morning his finger stick was still "high" and he was feeling confused and disoriented He was able to call EMS, and on their arrival the patient was confused so he was bought in the [facility].   He denies fevers, cough, rash, dysuria, abd pain, N/V, diarrhea, constipation, headache, chest pain, or SOB. He denies any recent alcohol or drug use.     In the ED the patients vitals were: 97.0, 81, 103/54, 20, 100% on RA. He was found to have a FS that was "high." He was given 10 units of subcutaneous regular insulin. He then had a BMP which showed a anion gap of 41 with a bicarb of 5. pH on VBG was 6.99 and there was acetone in the blood. WBC was 34 and lactate was 6.3. The patient received 5L of NS in the ED and was started on a insulin gtt at 6.

## 2017-10-17 NOTE — CONSULT NOTE ADULT - SUBJECTIVE AND OBJECTIVE BOX
HPI:  Pt is a 62 year old M w/ a PMH of type 1 DM w/ retinopathy. HTN, and HLD that presents with hyperglycemia. As per the patient he is usually managed well on a insulin pump that is controlled by a external controller that tells him when his pump is empty. 2 days PTA misplaced his controller while visiting [place].  In the morning patient reports having jury duty and did not have any syringes to bolus insulin. He attempted to give himself a bolus of insulin later that night, however, in the morning his finger stick was still "high" and he was feeling confused and disoriented He was able to call EMS, and on their arrival the patient was confused so he was bought in the [facility]. Reports that was feeling more lethargic and was having N/V, NBNB. Was too weak to even get up to go to bathroom. He denies fevers, cough, rash, dysuria, abd pain, diarrhea, constipation, headache, chest pain, or SOB. He denies any recent alcohol or drug use. No sick contacts.    In the ED the patients vitals were: 97.0, 81, 103/54, 20, 100% on RA. He was found to have a FS that was "high." He was given 10 units of subcutaneous regular insulin. He then had a BMP which showed a anion gap of 41 with a bicarb of 5. pH on VBG was 6.99 and there was acetone in the blood. WBC was 34 and lactate was 6.3. The patient received 5L of NS in the ED and was started on a insulin gtt at 6. (17 Oct 2017 14:08). Patient admitted to MICU.    Patient seen in ED. Reports being diagnosed with DM at age 36. Classifies himself at Type 1.5. Endocrinologist Dr. Phelps in [place], [place]. Reports has been on insulin pump for 1 year and has been well controlled. Reports last A1C 1 mo ago outpatient was 7.3. Had one previous episode of DKA more than 10 yrs ago. No other hospitalizations for DM. Has diabetic retinopathy. Denies any neuropathy, nephropathy, or cardiovascular complications of DM.      PAST MEDICAL & SURGICAL HISTORY:  Hypothyroid  HTN (hypertension)  Diabetes  No significant past surgical history      FAMILY HISTORY:  No pertinent family history in first degree relatives      Social History: No alcohol, smoking, or illicit drug use.    Outpatient Medications:  asa 81mg  enalapril 20mg  atorvastatin 20mg  amlodopine 10mg  synthroid 88mcg      MEDICATIONS  (STANDING):  aspirin  chewable 81 milliGRAM(s) Oral daily  atorvastatin 20 milliGRAM(s) Oral at bedtime  heparin  Injectable 5000 Unit(s) SubCutaneous every 8 hours  insulin Infusion 10 Unit(s)/Hr (10 mL/Hr) IV Continuous <Continuous>  levothyroxine 88 MICROGram(s) Oral daily  sodium chloride 0.9%. 1000 milliLiter(s) (500 mL/Hr) IV Continuous <Continuous>    MEDICATIONS  (PRN):      Allergies    No Known Allergies    Intolerances      Review of Systems:  Constitutional: No fever  Eyes: No blurry vision  Neuro: No tremors  HEENT: No pain  Cardiovascular: No chest pain, palpitations  Respiratory: No SOB, no cough  GI: Has N/V, NBNB. No abd pain, no diarrhea.  : No dysuria  Skin: no rash  Endocrine: Denies polyuria. Has polydipsia.  Hem/lymph: no swelling    ALL OTHER SYSTEMS REVIEWED AND NEGATIVE      PHYSICAL EXAM:  VITALS: T(C): 36.1 (10-17-17 @ 10:44)  T(F): 97 (10-17-17 @ 10:44), Max: 97 (10-17-17 @ 10:44)  HR: 92 (10-17-17 @ 14:17) (81 - 92)  BP: 109/58 (10-17-17 @ 14:17) (103/54 - 112/56)  RR:  (17 - 24)  SpO2:  (99% - 100%)  Wt(kg): --  GENERAL: Patient appears in mild distress, well-developed  EYES: No proptosis, no lid lag, anicteric  HEENT:  Atraumatic, Normocephalic, dry mucous membranes  THYROID: Normal size, no palpable nodules  RESPIRATORY: Clear to auscultation bilaterally; No rales, rhonchi, wheezing  CARDIOVASCULAR: Regular rate and rhythm; No murmurs; no peripheral edema  GI: Soft, nontender, non distended, normal bowel sounds  SKIN: Dry, intact, No rashes or lesions  MUSCULOSKELETAL: Full range of motion, generalized weakness  NEURO: sensation intact, extraocular movements intact, no tremor  PSYCH: Alert and oriented x 3, but lethargic.   CUSHING'S SIGNS: no striae    CAPILLARY BLOOD GLUCOSE      POCT Blood Glucose.: >600 mg/dL (17 Oct 2017 13:57)  POCT Blood Glucose.: >600 mg/dL (17 Oct 2017 13:03)  POCT Blood Glucose.: >600 mg/dL (17 Oct 2017 12:01)  POCT Blood Glucose.: >600 mg/dL (17 Oct 2017 10:29)                            12.6   34.1  )-----------( 349      ( 17 Oct 2017 10:36 )             39.9       10-17    128<L>  |  82<L>  |  70<H>  ----------------------------<  1190<HH>  8.0<HH>   |  5<LL>  |  3.79<H>    EGFR if : 18<L>  EGFR if non : 16<L>    Ca    8.5      10-17    TPro  6.5  /  Alb  4.2  /  TBili  0.3  /  DBili  x   /  AST  37  /  ALT  30  /  AlkPhos  75  10-17      Thyroid Function Tests:                Radiology: Chest xray- clear lungs HPI:  Pt is a 62 year old M w/ a PMH of type 1 DM w/ retinopathy. HTN, and HLD that presents with hyperglycemia. As per the patient he is usually managed well on a insulin pump that is controlled by a external controller that tells him when his pump is empty. 2 days PTA misplaced his controller while visiting [place].  In the morning patient reports having jury duty and did not have any syringes to bolus insulin. He attempted to give himself a bolus of insulin later that night, however, in the morning his finger stick was still "high" and he was feeling confused and disoriented He was able to call EMS, and on their arrival the patient was confused so he was bought in the [facility]. Reports that was feeling more lethargic and was having N/V, NBNB. Was too weak to even get up to go to bathroom. He denies fevers, cough, rash, dysuria, abd pain, diarrhea, constipation, headache, chest pain, or SOB. He denies any recent alcohol or drug use. No sick contacts.    In the ED the patients vitals were: 97.0, 81, 103/54, 20, 100% on RA. He was found to have a FS that was "high." He was given 10 units of subcutaneous regular insulin. He then had a BMP which showed a anion gap of 41 with a bicarb of 5. pH on VBG was 6.99 and there was acetone in the blood. WBC was 34 and lactate was 6.3. The patient received 5L of NS in the ED and was started on a insulin gtt at 6. (17 Oct 2017 14:08). Patient admitted to MICU.    Patient seen in ED. Reports being diagnosed with DM at age 36. Classifies himself at Type 1.5. Endocrinologist Dr. Phelps in [place], [place]. Reports has been on insulin pump for 1 year and has been well controlled. Reports last A1C 1 mo ago outpatient was 7.3. Had one previous episode of DKA more than 10 yrs ago. No other hospitalizations for DM. Has diabetic retinopathy. Denies any neuropathy, nephropathy, or cardiovascular complications of DM.      PAST MEDICAL & SURGICAL HISTORY:  Hypothyroid  HTN (hypertension)  Diabetes  No significant past surgical history      FAMILY HISTORY:  No pertinent family history in first degree relatives      Social History: No alcohol, smoking, or illicit drug use. Works as  and lives with wife.    Outpatient Medications:  asa 81mg  enalapril 20mg  atorvastatin 20mg  amlodopine 10mg  synthroid 88mcg      MEDICATIONS  (STANDING):  aspirin  chewable 81 milliGRAM(s) Oral daily  atorvastatin 20 milliGRAM(s) Oral at bedtime  heparin  Injectable 5000 Unit(s) SubCutaneous every 8 hours  insulin Infusion 10 Unit(s)/Hr (10 mL/Hr) IV Continuous <Continuous>  levothyroxine 88 MICROGram(s) Oral daily  sodium chloride 0.9%. 1000 milliLiter(s) (500 mL/Hr) IV Continuous <Continuous>    MEDICATIONS  (PRN):      Allergies    No Known Allergies    Intolerances      Review of Systems:  Constitutional: No fever  Eyes: No blurry vision  Neuro: No tremors  HEENT: No pain  Cardiovascular: No chest pain, palpitations  Respiratory: No SOB, no cough  GI: Has N/V, NBNB. No abd pain, no diarrhea.  : No dysuria  Skin: no rash  Endocrine: Denies polyuria. Has polydipsia.  Hem/lymph: no swelling    ALL OTHER SYSTEMS REVIEWED AND NEGATIVE      PHYSICAL EXAM:  VITALS: T(C): 36.1 (10-17-17 @ 10:44)  T(F): 97 (10-17-17 @ 10:44), Max: 97 (10-17-17 @ 10:44)  HR: 92 (10-17-17 @ 14:17) (81 - 92)  BP: 109/58 (10-17-17 @ 14:17) (103/54 - 112/56)  RR:  (17 - 24)  SpO2:  (99% - 100%)  Wt(kg): --  GENERAL: Patient appears in mild distress, well-developed  EYES: No proptosis, no lid lag, anicteric  HEENT:  Atraumatic, Normocephalic, dry mucous membranes  THYROID: Normal size, no palpable nodules  RESPIRATORY: Clear to auscultation bilaterally; No rales, rhonchi, wheezing  CARDIOVASCULAR: Regular rate and rhythm; No murmurs; no peripheral edema  GI: Soft, nontender, non distended, normal bowel sounds  SKIN: Dry, intact, No rashes or lesions  MUSCULOSKELETAL: Full range of motion, generalized weakness  NEURO: sensation intact, extraocular movements intact, no tremor  PSYCH: Alert and oriented x 3, but lethargic.   CUSHING'S SIGNS: no striae    CAPILLARY BLOOD GLUCOSE      POCT Blood Glucose.: >600 mg/dL (17 Oct 2017 13:57)  POCT Blood Glucose.: >600 mg/dL (17 Oct 2017 13:03)  POCT Blood Glucose.: >600 mg/dL (17 Oct 2017 12:01)  POCT Blood Glucose.: >600 mg/dL (17 Oct 2017 10:29)                            12.6   34.1  )-----------( 349      ( 17 Oct 2017 10:36 )             39.9       10-17    128<L>  |  82<L>  |  70<H>  ----------------------------<  1190<HH>  8.0<HH>   |  5<LL>  |  3.79<H>    EGFR if : 18<L>  EGFR if non : 16<L>    Ca    8.5      10-17    TPro  6.5  /  Alb  4.2  /  TBili  0.3  /  DBili  x   /  AST  37  /  ALT  30  /  AlkPhos  75  10-17      Thyroid Function Tests:                Radiology: Chest xray- clear lungs HPI:  Pt is a 62 year old M w/ a PMH of type 1 DM w/ retinopathy. HTN, and HLD that presents with hyperglycemia. As per the patient he is usually managed well on a insulin pump that is controlled by a external controller that tells him when his pump is empty. 2 days PTA misplaced his controller while visiting [place].  In the morning patient reports having jury duty and did not have any syringes to bolus insulin. He attempted to give himself a bolus of insulin later that night, however, in the morning his finger stick was still "high" and he was feeling confused and disoriented He was able to call EMS, and on their arrival the patient was confused so he was bought in the [facility]. Reports that was feeling more lethargic and was having N/V, NBNB. Was too weak to even get up to go to bathroom. He denies fevers, cough, rash, dysuria, abd pain, diarrhea, constipation, headache, chest pain, or SOB. He denies any recent alcohol or drug use. No sick contacts.    In the ED the patients vitals were: 97.0, 81, 103/54, 20, 100% on RA. He was found to have a FS that was "high." He was given 10 units of subcutaneous regular insulin. He then had a BMP which showed a anion gap of 41 with a bicarb of 5. pH on VBG was 6.99 and there was acetone in the blood. WBC was 34 and lactate was 6.3. The patient received 5L of NS in the ED and was started on a insulin gtt at 6. (17 Oct 2017 14:08). Patient admitted to MICU.    Patient seen in ED. Reports being diagnosed with DM at age 36. Classifies himself at Type 1.5. Endocrinologist Dr. Phelps in [place], [place]. Reports has been on insulin pump for 1 year and has been well controlled. Reports last A1C 1 mo ago outpatient was 7.3. Had one previous episode of DKA more than 10 yrs ago. No other hospitalizations for DM. Has diabetic retinopathy. Denies any neuropathy, nephropathy, or cardiovascular complications of DM.      PAST MEDICAL & SURGICAL HISTORY:  Hypothyroid  HTN (hypertension)  Diabetes  No significant past surgical history      FAMILY HISTORY:  No pertinent family history in first degree relatives      Social History: No alcohol, smoking, or illicit drug use. Works as  and lives with wife.    Outpatient Medications:  asa 81mg  enalapril 20mg  atorvastatin 20mg  amlodopine 10mg  synthroid 88mcg      MEDICATIONS  (STANDING):  aspirin  chewable 81 milliGRAM(s) Oral daily  atorvastatin 20 milliGRAM(s) Oral at bedtime  heparin  Injectable 5000 Unit(s) SubCutaneous every 8 hours  insulin Infusion 10 Unit(s)/Hr (10 mL/Hr) IV Continuous <Continuous>  levothyroxine 88 MICROGram(s) Oral daily  sodium chloride 0.9%. 1000 milliLiter(s) (500 mL/Hr) IV Continuous <Continuous>    MEDICATIONS  (PRN):      Allergies    No Known Allergies    Intolerances      Review of Systems:  Constitutional: No fever  Eyes: No blurry vision  Neuro: No tremors  HEENT: No pain  Cardiovascular: No chest pain, palpitations  Respiratory: No SOB, no cough  GI: Has N/V, NBNB. No abd pain, no diarrhea.  : No dysuria  Skin: no rash  Endocrine: Denies polyuria. Has polydipsia.  Hem/lymph: no swelling    ALL OTHER SYSTEMS REVIEWED AND NEGATIVE      PHYSICAL EXAM:  VITALS: T(C): 36.1 (10-17-17 @ 10:44)  T(F): 97 (10-17-17 @ 10:44), Max: 97 (10-17-17 @ 10:44)  HR: 92 (10-17-17 @ 14:17) (81 - 92)  BP: 109/58 (10-17-17 @ 14:17) (103/54 - 112/56)  RR:  (17 - 24)  SpO2:  (99% - 100%)  Wt(kg): --  GENERAL: Patient appears in mild distress, well-developed with shaking chills and ruddiness of the face  EYES: No proptosis, no lid lag, anicteric  HEENT:  Atraumatic, Normocephalic, dry mucous membranes  RESPIRATORY: Clear to auscultation bilaterally; No rales, rhonchi, wheezing  CARDIOVASCULAR: Regular rate and rhythm; No murmurs; no peripheral edema  GI: Soft, nontender, non distended, normal bowel sounds  SKIN: Dry, intact, No rashes or lesions on LE and feet b/l  MUSCULOSKELETAL: Full range of motion, generalized weakness  NEURO: sensation intact, extraocular movements intact, no tremor  PSYCH: Alert and oriented x 3, but lethargic.   CUSHING'S SIGNS: no striae    POCT Blood Glucose.: >600 mg/dL (17 Oct 2017 13:57)  POCT Blood Glucose.: >600 mg/dL (17 Oct 2017 13:03)  POCT Blood Glucose.: >600 mg/dL (17 Oct 2017 12:01)  POCT Blood Glucose.: >600 mg/dL (17 Oct 2017 10:29)                            12.6   34.1  )-----------( 349      ( 17 Oct 2017 10:36 )             39.9       10-17    128<L>  |  82<L>  |  70<H>  ----------------------------<  1190<HH>  8.0<HH>   |  5<LL>  |  3.79<H>    EGFR if : 18<L>  EGFR if non : 16<L>    Ca    8.5      10-17    TPro  6.5  /  Alb  4.2  /  TBili  0.3  /  DBili  x   /  AST  37  /  ALT  30  /  AlkPhos  75  10-17      Thyroid Function Tests:                Radiology: Chest xray- clear lungs

## 2017-10-17 NOTE — H&P ADULT - NSHPREVIEWOFSYSTEMS_GEN_ALL_CORE
Review of Systems:   CONSTITUTIONAL: No fever, weight loss, or fatigue  EYES: No eye pain, visual disturbances, or discharge  ENMT:  No difficulty hearing, tinnitus, vertigo; No sinus or throat pain  NECK: No pain or stiffness  RESPIRATORY: No cough, wheezing, chills or hemoptysis; No shortness of breath  CARDIOVASCULAR: No chest pain, palpitations, dizziness, or leg swelling  GASTROINTESTINAL: No abdominal or epigastric pain. No nausea, vomiting, or hematemesis; No diarrhea or constipation. No melena or hematochezia.  GENITOURINARY: No dysuria, frequency, hematuria, or incontinence  NEUROLOGICAL: No headaches, memory loss, loss of strength, numbness, or tremors  SKIN: No itching, burning, rashes, or lesions   MUSCULOSKELETAL: No joint pain or swelling; No muscle, back, or extremity pain  PSYCHIATRIC: No depression, anxiety, mood swings, or difficulty sleeping  HEME/LYMPH: No easy bruising, or bleeding gums

## 2017-10-17 NOTE — H&P ADULT - PROBLEM SELECTOR PLAN 5
- Hold all hypertensive medications as pts BP is low, likely in the setting of acidosis and dehydration with ? Sepsis.

## 2017-10-17 NOTE — CONSULT NOTE ADULT - PROBLEM SELECTOR RECOMMENDATION 9
-Patient profoundly acidotic with last pH of 6.94 and glucose of 874 and AG 41.  -now on insulin gtt and c/w gtt as per ICU protocol  -FS q1hr  -BMP, Mg, phos q4hr and monitor electrolytes and replete.  -IVF with pt clinically dehydrated  -check A1C

## 2017-10-17 NOTE — ED PROVIDER NOTE - OBJECTIVE STATEMENT
62M PMH DM HTN Hypothyroidism p/w DKA.  He was selected for jury duty 2 days ago and did not realize his insulin pump was disconnected.  His blood glucose climbed progressively higher over 2 days.  Tried to bolus insulin yesterday but his pump had "."  He had vomiting throughout the night and his daughter called EMS this morning because he was confused.  EMS found him alert and oriented x1, measured blood glucose of "Hi" and gave him 1 liter of fluid.  on arrival he was alert and oriented x3, conversant, though he feels ill and is slurring his speech.  Has had DKA x1 in the past.  No recent illnesses, no fever.

## 2017-10-17 NOTE — CONSULT NOTE ADULT - ATTENDING COMMENTS
Pt seen and examined. Agree with note as above with addendum, pt with an omnipod device. The ED has removed the POD and his wife took the PDM home. Agree with not using the pump until he is more stable from a mental status and vitals standpoint. Would keep on the insulin gtt for at least the next day.

## 2017-10-17 NOTE — H&P ADULT - ASSESSMENT
62 year old M w/ a PMH of type 1 DM, HTN, and HLD that presents with hyperglycemia. He was found to have a FS that was "high." He was given 10 units of subcutaneous regular insulin. He then had a BMP which showed a anion gap of 41 with a bicarb of 5. pH on VBG was 6.99 and there was acetone in the blood. WBC was 34 and lactate was 6.3. The patient received 5L of NS in the ED and was started on a insulin gtt at 6. He was admitted to the MICU of DKA

## 2017-10-17 NOTE — CONSULT NOTE ADULT - PROBLEM SELECTOR RECOMMENDATION 2
-pt with WBC 34 and lactate 4.9 downtrending. Would r/o source of infection with profound acidosis.   -Chest xray clear. lipase WNL  -Obtain UA.   -Blood cx

## 2017-10-17 NOTE — H&P ADULT - NSHPLABSRESULTS_GEN_ALL_CORE
12.6   34.1  )-----------( 349      ( 17 Oct 2017 10:36 )             39.9     10-17    128<L>  |  82<L>  |  70<H>  ----------------------------<  1190<HH>  8.0<HH>   |  5<LL>  |  3.79<H>    Ca    8.5      17 Oct 2017 10:36    TPro  6.5  /  Alb  4.2  /  TBili  0.3  /  DBili  x   /  AST  37  /  ALT  30  /  AlkPhos  75  10-17    PT/INR - ( 17 Oct 2017 10:36 )   PT: 13.8 sec;   INR: 1.27 ratio         PTT - ( 17 Oct 2017 10:36 )  PTT:26.3 sec

## 2017-10-17 NOTE — ED PROVIDER NOTE - MEDICAL DECISION MAKING DETAILS
Yenny: Pt became weak and fell. Hx of CA with mets and complication. Could not get off floor.  Unknown head trauma. Abd pain is at baseline. Will look for infectious source. Yenny: Patient presents breathing fast, sleepy with high glucase. DKA probable. Insulin pump may have run out. No fever. no prodrome infection. Looks very dry. Yenny: Patient presents breathing fast, sleepy with high glucose. DKA probable. Insulin pump may have run out. No fever. no prodrome infection. Looks very dry.

## 2017-10-17 NOTE — H&P ADULT - NSHPPHYSICALEXAM_GEN_ALL_CORE
PHYSICAL EXAM:  GENERAL: NAD, well-developed. Kussmaul breathing.   HEAD:  Atraumatic, Normocephalic  EYES: EOMI, PERRLA, conjunctiva and sclera clear  NECK: Supple, No JVD  CHEST/LUNG: Clear to auscultation bilaterally; No wheeze  HEART: Regular rate and rhythm; No murmurs, rubs, or gallops  ABDOMEN: Soft, Nontender, Nondistended; Bowel sounds present  EXTREMITIES:  2+ Peripheral Pulses, No clubbing, cyanosis, or edema  PSYCH: AAOx3  NEUROLOGY: non-focal  SKIN: No rashes or lesions

## 2017-10-17 NOTE — ED PROVIDER NOTE - ATTENDING CONTRIBUTION TO CARE
I performed a history and physical exam of the patient and discussed their management with the resident. I reviewed the resident's note and agree with the documented findings and plan of care. My medical decision making and observations are found above.  abd mild diffuse tenderness. Very jaundice. I performed a history and physical exam of the patient and discussed their management with the resident. I reviewed the resident's note and agree with the documented findings and plan of care. My medical decision making and observations are found above.  Abd soft but mild rt sided tenderness. talking a+0*3 new the date and day.

## 2017-10-18 LAB
-  COAGULASE NEGATIVE STAPHYLOCOCCUS: SIGNIFICANT CHANGE UP
ANION GAP SERPL CALC-SCNC: 12 MMOL/L — SIGNIFICANT CHANGE UP (ref 5–17)
ANION GAP SERPL CALC-SCNC: 13 MMOL/L — SIGNIFICANT CHANGE UP (ref 5–17)
ANION GAP SERPL CALC-SCNC: 15 MMOL/L — SIGNIFICANT CHANGE UP (ref 5–17)
ANION GAP SERPL CALC-SCNC: 18 MMOL/L — HIGH (ref 5–17)
APTT BLD: 20.4 SEC — LOW (ref 27.5–37.4)
BASE EXCESS BLDV CALC-SCNC: -4.6 MMOL/L — LOW (ref -2–2)
BASE EXCESS BLDV CALC-SCNC: -6.1 MMOL/L — LOW (ref -2–2)
BASE EXCESS BLDV CALC-SCNC: -6.4 MMOL/L — LOW (ref -2–2)
BASE EXCESS BLDV CALC-SCNC: -7.7 MMOL/L — LOW (ref -2–2)
BASOPHILS # BLD AUTO: 0 K/UL — SIGNIFICANT CHANGE UP (ref 0–0.2)
BUN SERPL-MCNC: 67 MG/DL — HIGH (ref 7–23)
BUN SERPL-MCNC: 69 MG/DL — HIGH (ref 7–23)
BUN SERPL-MCNC: 71 MG/DL — HIGH (ref 7–23)
BUN SERPL-MCNC: 73 MG/DL — HIGH (ref 7–23)
CA-I SERPL-SCNC: 1.13 MMOL/L — SIGNIFICANT CHANGE UP (ref 1.12–1.3)
CA-I SERPL-SCNC: 1.16 MMOL/L — SIGNIFICANT CHANGE UP (ref 1.12–1.3)
CA-I SERPL-SCNC: 1.17 MMOL/L — SIGNIFICANT CHANGE UP (ref 1.12–1.3)
CA-I SERPL-SCNC: 1.18 MMOL/L — SIGNIFICANT CHANGE UP (ref 1.12–1.3)
CALCIUM SERPL-MCNC: 7.7 MG/DL — LOW (ref 8.4–10.5)
CALCIUM SERPL-MCNC: 7.7 MG/DL — LOW (ref 8.4–10.5)
CALCIUM SERPL-MCNC: 7.9 MG/DL — LOW (ref 8.4–10.5)
CALCIUM SERPL-MCNC: 8 MG/DL — LOW (ref 8.4–10.5)
CHLORIDE BLDV-SCNC: 106 MMOL/L — SIGNIFICANT CHANGE UP (ref 96–108)
CHLORIDE BLDV-SCNC: 109 MMOL/L — HIGH (ref 96–108)
CHLORIDE BLDV-SCNC: 113 MMOL/L — HIGH (ref 96–108)
CHLORIDE BLDV-SCNC: 114 MMOL/L — HIGH (ref 96–108)
CHLORIDE SERPL-SCNC: 103 MMOL/L — SIGNIFICANT CHANGE UP (ref 96–108)
CHLORIDE SERPL-SCNC: 107 MMOL/L — SIGNIFICANT CHANGE UP (ref 96–108)
CHLORIDE SERPL-SCNC: 109 MMOL/L — HIGH (ref 96–108)
CHLORIDE SERPL-SCNC: 112 MMOL/L — HIGH (ref 96–108)
CHLORIDE UR-SCNC: <20 MMOL/L — SIGNIFICANT CHANGE UP
CHOLEST SERPL-MCNC: 99 MG/DL — SIGNIFICANT CHANGE UP (ref 10–199)
CK MB BLD-MCNC: 2.7 % — SIGNIFICANT CHANGE UP (ref 0–3.5)
CK MB BLD-MCNC: 3 % — SIGNIFICANT CHANGE UP (ref 0–3.5)
CK MB BLD-MCNC: 3.1 % — SIGNIFICANT CHANGE UP (ref 0–3.5)
CK MB CFR SERPL CALC: 24.4 NG/ML — HIGH (ref 0–6.7)
CK MB CFR SERPL CALC: 31.4 NG/ML — HIGH (ref 0–6.7)
CK MB CFR SERPL CALC: 33.4 NG/ML — HIGH (ref 0–6.7)
CK SERPL-CCNC: 1024 U/L — HIGH (ref 30–200)
CK SERPL-CCNC: 1119 U/L — HIGH (ref 30–200)
CK SERPL-CCNC: 900 U/L — HIGH (ref 30–200)
CO2 BLDV-SCNC: 20 MMOL/L — LOW (ref 22–30)
CO2 BLDV-SCNC: 21 MMOL/L — LOW (ref 22–30)
CO2 BLDV-SCNC: 21 MMOL/L — LOW (ref 22–30)
CO2 BLDV-SCNC: 22 MMOL/L — SIGNIFICANT CHANGE UP (ref 22–30)
CO2 SERPL-SCNC: 18 MMOL/L — LOW (ref 22–31)
CO2 SERPL-SCNC: 19 MMOL/L — LOW (ref 22–31)
CO2 SERPL-SCNC: 20 MMOL/L — LOW (ref 22–31)
CO2 SERPL-SCNC: 20 MMOL/L — LOW (ref 22–31)
CREAT ?TM UR-MCNC: 169 MG/DL — SIGNIFICANT CHANGE UP
CREAT SERPL-MCNC: 2.75 MG/DL — HIGH (ref 0.5–1.3)
CREAT SERPL-MCNC: 3.1 MG/DL — HIGH (ref 0.5–1.3)
CREAT SERPL-MCNC: 3.38 MG/DL — HIGH (ref 0.5–1.3)
CREAT SERPL-MCNC: 3.69 MG/DL — HIGH (ref 0.5–1.3)
CULTURE RESULTS: NO GROWTH — SIGNIFICANT CHANGE UP
EOSINOPHIL # BLD AUTO: 0.1 K/UL — SIGNIFICANT CHANGE UP (ref 0–0.5)
EOSINOPHIL NFR BLD AUTO: 1 % — SIGNIFICANT CHANGE UP (ref 0–6)
EOSINOPHIL NFR URNS MANUAL: NEGATIVE — SIGNIFICANT CHANGE UP
GAS PNL BLDV: 135 MMOL/L — LOW (ref 136–145)
GAS PNL BLDV: 137 MMOL/L — SIGNIFICANT CHANGE UP (ref 136–145)
GAS PNL BLDV: 140 MMOL/L — SIGNIFICANT CHANGE UP (ref 136–145)
GAS PNL BLDV: 140 MMOL/L — SIGNIFICANT CHANGE UP (ref 136–145)
GAS PNL BLDV: SIGNIFICANT CHANGE UP
GLUCOSE BLDC GLUCOMTR-MCNC: 167 MG/DL — HIGH (ref 70–99)
GLUCOSE BLDC GLUCOMTR-MCNC: 175 MG/DL — HIGH (ref 70–99)
GLUCOSE BLDC GLUCOMTR-MCNC: 183 MG/DL — HIGH (ref 70–99)
GLUCOSE BLDC GLUCOMTR-MCNC: 183 MG/DL — HIGH (ref 70–99)
GLUCOSE BLDC GLUCOMTR-MCNC: 186 MG/DL — HIGH (ref 70–99)
GLUCOSE BLDC GLUCOMTR-MCNC: 194 MG/DL — HIGH (ref 70–99)
GLUCOSE BLDC GLUCOMTR-MCNC: 203 MG/DL — HIGH (ref 70–99)
GLUCOSE BLDC GLUCOMTR-MCNC: 203 MG/DL — HIGH (ref 70–99)
GLUCOSE BLDC GLUCOMTR-MCNC: 225 MG/DL — HIGH (ref 70–99)
GLUCOSE BLDC GLUCOMTR-MCNC: 243 MG/DL — HIGH (ref 70–99)
GLUCOSE BLDC GLUCOMTR-MCNC: 257 MG/DL — HIGH (ref 70–99)
GLUCOSE BLDC GLUCOMTR-MCNC: 266 MG/DL — HIGH (ref 70–99)
GLUCOSE BLDC GLUCOMTR-MCNC: 272 MG/DL — HIGH (ref 70–99)
GLUCOSE BLDC GLUCOMTR-MCNC: 319 MG/DL — HIGH (ref 70–99)
GLUCOSE BLDC GLUCOMTR-MCNC: 345 MG/DL — HIGH (ref 70–99)
GLUCOSE BLDC GLUCOMTR-MCNC: 384 MG/DL — HIGH (ref 70–99)
GLUCOSE BLDC GLUCOMTR-MCNC: 415 MG/DL — HIGH (ref 70–99)
GLUCOSE BLDC GLUCOMTR-MCNC: 458 MG/DL — CRITICAL HIGH (ref 70–99)
GLUCOSE BLDC GLUCOMTR-MCNC: 494 MG/DL — CRITICAL HIGH (ref 70–99)
GLUCOSE BLDC GLUCOMTR-MCNC: 592 MG/DL — CRITICAL HIGH (ref 70–99)
GLUCOSE BLDV-MCNC: 189 MG/DL — HIGH (ref 70–99)
GLUCOSE BLDV-MCNC: 245 MG/DL — HIGH (ref 70–99)
GLUCOSE BLDV-MCNC: 333 MG/DL — HIGH (ref 70–99)
GLUCOSE BLDV-MCNC: 517 MG/DL — CRITICAL HIGH (ref 70–99)
GLUCOSE SERPL-MCNC: 199 MG/DL — HIGH (ref 70–99)
GLUCOSE SERPL-MCNC: 267 MG/DL — HIGH (ref 70–99)
GLUCOSE SERPL-MCNC: 379 MG/DL — HIGH (ref 70–99)
GLUCOSE SERPL-MCNC: 600 MG/DL — CRITICAL HIGH (ref 70–99)
GRAM STN FLD: SIGNIFICANT CHANGE UP
HCO3 BLDV-SCNC: 18 MMOL/L — LOW (ref 21–29)
HCO3 BLDV-SCNC: 20 MMOL/L — LOW (ref 21–29)
HCO3 BLDV-SCNC: 20 MMOL/L — LOW (ref 21–29)
HCO3 BLDV-SCNC: 21 MMOL/L — SIGNIFICANT CHANGE UP (ref 21–29)
HCT VFR BLD CALC: 35 % — LOW (ref 39–50)
HCT VFR BLDA CALC: 35 % — LOW (ref 39–50)
HCT VFR BLDA CALC: 35 % — LOW (ref 39–50)
HCT VFR BLDA CALC: 36 % — LOW (ref 39–50)
HCT VFR BLDA CALC: 39 % — SIGNIFICANT CHANGE UP (ref 39–50)
HDLC SERPL-MCNC: 32 MG/DL — LOW (ref 40–125)
HGB BLD CALC-MCNC: 11.3 G/DL — LOW (ref 13–17)
HGB BLD CALC-MCNC: 11.3 G/DL — LOW (ref 13–17)
HGB BLD CALC-MCNC: 11.7 G/DL — LOW (ref 13–17)
HGB BLD CALC-MCNC: 12.6 G/DL — LOW (ref 13–17)
HGB BLD-MCNC: 12.5 G/DL — LOW (ref 13–17)
HOROWITZ INDEX BLDV+IHG-RTO: 21 — SIGNIFICANT CHANGE UP
HOROWITZ INDEX BLDV+IHG-RTO: 21 — SIGNIFICANT CHANGE UP
INR BLD: 1.17 RATIO — HIGH (ref 0.88–1.16)
LACTATE BLDV-MCNC: 1.1 MMOL/L — SIGNIFICANT CHANGE UP (ref 0.7–2)
LACTATE BLDV-MCNC: 1.4 MMOL/L — SIGNIFICANT CHANGE UP (ref 0.7–2)
LACTATE BLDV-MCNC: 2 MMOL/L — SIGNIFICANT CHANGE UP (ref 0.7–2)
LACTATE BLDV-MCNC: 2 MMOL/L — SIGNIFICANT CHANGE UP (ref 0.7–2)
LEGIONELLA AG UR QL: NEGATIVE — SIGNIFICANT CHANGE UP
LIPID PNL WITH DIRECT LDL SERPL: 53 MG/DL — SIGNIFICANT CHANGE UP
LYMPHOCYTES # BLD AUTO: 1 K/UL — SIGNIFICANT CHANGE UP (ref 1–3.3)
LYMPHOCYTES # BLD AUTO: 3 % — LOW (ref 13–44)
MAGNESIUM SERPL-MCNC: 1.8 MG/DL — SIGNIFICANT CHANGE UP (ref 1.6–2.6)
MAGNESIUM SERPL-MCNC: 1.9 MG/DL — SIGNIFICANT CHANGE UP (ref 1.6–2.6)
MAGNESIUM SERPL-MCNC: 1.9 MG/DL — SIGNIFICANT CHANGE UP (ref 1.6–2.6)
MAGNESIUM SERPL-MCNC: 2 MG/DL — SIGNIFICANT CHANGE UP (ref 1.6–2.6)
MCHC RBC-ENTMCNC: 34.2 PG — HIGH (ref 27–34)
MCHC RBC-ENTMCNC: 35.7 GM/DL — SIGNIFICANT CHANGE UP (ref 32–36)
MCV RBC AUTO: 95.8 FL — SIGNIFICANT CHANGE UP (ref 80–100)
METHOD TYPE: SIGNIFICANT CHANGE UP
MONOCYTES # BLD AUTO: 1.8 K/UL — HIGH (ref 0–0.9)
MONOCYTES NFR BLD AUTO: 6 % — SIGNIFICANT CHANGE UP (ref 2–14)
NEUTROPHILS # BLD AUTO: 23.5 K/UL — HIGH (ref 1.8–7.4)
NEUTROPHILS NFR BLD AUTO: 85 % — HIGH (ref 43–77)
NEUTS BAND # BLD: 5 % — SIGNIFICANT CHANGE UP (ref 0–8)
OSMOLALITY UR: 505 MOS/KG — SIGNIFICANT CHANGE UP (ref 300–900)
OTHER CELLS CSF MANUAL: 10 ML/DL — LOW (ref 18–22)
OTHER CELLS CSF MANUAL: 10 ML/DL — LOW (ref 18–22)
OTHER CELLS CSF MANUAL: 8 ML/DL — LOW (ref 18–22)
OTHER CELLS CSF MANUAL: 9 ML/DL — LOW (ref 18–22)
PCO2 BLDV: 41 MMHG — SIGNIFICANT CHANGE UP (ref 35–50)
PCO2 BLDV: 42 MMHG — SIGNIFICANT CHANGE UP (ref 35–50)
PCO2 BLDV: 42 MMHG — SIGNIFICANT CHANGE UP (ref 35–50)
PCO2 BLDV: 44 MMHG — SIGNIFICANT CHANGE UP (ref 35–50)
PH BLDV: 7.27 — LOW (ref 7.35–7.45)
PH BLDV: 7.27 — LOW (ref 7.35–7.45)
PH BLDV: 7.29 — LOW (ref 7.35–7.45)
PH BLDV: 7.32 — LOW (ref 7.35–7.45)
PHOSPHATE 24H UR-MCNC: 45 MG/DL — SIGNIFICANT CHANGE UP
PHOSPHATE SERPL-MCNC: 2.7 MG/DL — SIGNIFICANT CHANGE UP (ref 2.5–4.5)
PHOSPHATE SERPL-MCNC: 2.8 MG/DL — SIGNIFICANT CHANGE UP (ref 2.5–4.5)
PHOSPHATE SERPL-MCNC: 2.9 MG/DL — SIGNIFICANT CHANGE UP (ref 2.5–4.5)
PHOSPHATE SERPL-MCNC: 3.1 MG/DL — SIGNIFICANT CHANGE UP (ref 2.5–4.5)
PLAT MORPH BLD: NORMAL — SIGNIFICANT CHANGE UP
PLATELET # BLD AUTO: 251 K/UL — SIGNIFICANT CHANGE UP (ref 150–400)
PO2 BLDV: 28 MMHG — SIGNIFICANT CHANGE UP (ref 25–45)
PO2 BLDV: 30 MMHG — SIGNIFICANT CHANGE UP (ref 25–45)
PO2 BLDV: 32 MMHG — SIGNIFICANT CHANGE UP (ref 25–45)
PO2 BLDV: 33 MMHG — SIGNIFICANT CHANGE UP (ref 25–45)
POTASSIUM BLDV-SCNC: 3.8 MMOL/L — SIGNIFICANT CHANGE UP (ref 3.5–5)
POTASSIUM BLDV-SCNC: 3.9 MMOL/L — SIGNIFICANT CHANGE UP (ref 3.5–5)
POTASSIUM BLDV-SCNC: 3.9 MMOL/L — SIGNIFICANT CHANGE UP (ref 3.5–5)
POTASSIUM BLDV-SCNC: 4 MMOL/L — SIGNIFICANT CHANGE UP (ref 3.5–5)
POTASSIUM SERPL-MCNC: 4.1 MMOL/L — SIGNIFICANT CHANGE UP (ref 3.5–5.3)
POTASSIUM SERPL-MCNC: 4.2 MMOL/L — SIGNIFICANT CHANGE UP (ref 3.5–5.3)
POTASSIUM SERPL-SCNC: 4.1 MMOL/L — SIGNIFICANT CHANGE UP (ref 3.5–5.3)
POTASSIUM SERPL-SCNC: 4.2 MMOL/L — SIGNIFICANT CHANGE UP (ref 3.5–5.3)
POTASSIUM UR-SCNC: 51 MMOL/L — SIGNIFICANT CHANGE UP
PROT ?TM UR-MCNC: 22 MG/DL — HIGH (ref 0–12)
PROTHROM AB SERPL-ACNC: 12.7 SEC — SIGNIFICANT CHANGE UP (ref 9.8–12.7)
RBC # BLD: 3.65 M/UL — LOW (ref 4.2–5.8)
RBC # FLD: 11.6 % — SIGNIFICANT CHANGE UP (ref 10.3–14.5)
RBC BLD AUTO: SIGNIFICANT CHANGE UP
SAO2 % BLDV: 49 % — LOW (ref 67–88)
SAO2 % BLDV: 56 % — LOW (ref 67–88)
SAO2 % BLDV: 58 % — LOW (ref 67–88)
SAO2 % BLDV: 61 % — LOW (ref 67–88)
SODIUM SERPL-SCNC: 139 MMOL/L — SIGNIFICANT CHANGE UP (ref 135–145)
SODIUM SERPL-SCNC: 141 MMOL/L — SIGNIFICANT CHANGE UP (ref 135–145)
SODIUM SERPL-SCNC: 142 MMOL/L — SIGNIFICANT CHANGE UP (ref 135–145)
SODIUM SERPL-SCNC: 144 MMOL/L — SIGNIFICANT CHANGE UP (ref 135–145)
SODIUM UR-SCNC: <20 MMOL/L — SIGNIFICANT CHANGE UP
SPECIMEN SOURCE: SIGNIFICANT CHANGE UP
SPECIMEN SOURCE: SIGNIFICANT CHANGE UP
TOTAL CHOLESTEROL/HDL RATIO MEASUREMENT: 3.1 RATIO — LOW (ref 3.4–9.6)
TRIGL SERPL-MCNC: 68 MG/DL — SIGNIFICANT CHANGE UP (ref 10–149)
TROPONIN T SERPL-MCNC: 0.36 NG/ML — HIGH (ref 0–0.06)
TROPONIN T SERPL-MCNC: 0.6 NG/ML — HIGH (ref 0–0.06)
TROPONIN T SERPL-MCNC: 0.66 NG/ML — HIGH (ref 0–0.06)
URATE UR-MCNC: 21.1 MG/DL — SIGNIFICANT CHANGE UP
UUN UR-MCNC: 773 MG/DL — SIGNIFICANT CHANGE UP
WBC # BLD: 26.3 K/UL — HIGH (ref 3.8–10.5)
WBC # FLD AUTO: 26.3 K/UL — HIGH (ref 3.8–10.5)

## 2017-10-18 PROCEDURE — 99291 CRITICAL CARE FIRST HOUR: CPT

## 2017-10-18 PROCEDURE — 93010 ELECTROCARDIOGRAM REPORT: CPT

## 2017-10-18 PROCEDURE — 93010 ELECTROCARDIOGRAM REPORT: CPT | Mod: 77

## 2017-10-18 PROCEDURE — 99233 SBSQ HOSP IP/OBS HIGH 50: CPT | Mod: GC

## 2017-10-18 RX ORDER — INSULIN GLARGINE 100 [IU]/ML
30 INJECTION, SOLUTION SUBCUTANEOUS ONCE
Qty: 0 | Refills: 0 | Status: COMPLETED | OUTPATIENT
Start: 2017-10-18 | End: 2017-10-18

## 2017-10-18 RX ORDER — LEVOTHYROXINE SODIUM 125 MCG
1 TABLET ORAL
Qty: 0 | Refills: 0 | COMMUNITY

## 2017-10-18 RX ORDER — AMLODIPINE BESYLATE 2.5 MG/1
1 TABLET ORAL
Qty: 0 | Refills: 0 | COMMUNITY

## 2017-10-18 RX ORDER — SODIUM CHLORIDE 9 MG/ML
1000 INJECTION, SOLUTION INTRAVENOUS
Qty: 0 | Refills: 0 | Status: DISCONTINUED | OUTPATIENT
Start: 2017-10-18 | End: 2017-10-18

## 2017-10-18 RX ORDER — INSULIN LISPRO 100/ML
5 VIAL (ML) SUBCUTANEOUS
Qty: 0 | Refills: 0 | Status: DISCONTINUED | OUTPATIENT
Start: 2017-10-18 | End: 2017-10-19

## 2017-10-18 RX ORDER — ATORVASTATIN CALCIUM 80 MG/1
1 TABLET, FILM COATED ORAL
Qty: 0 | Refills: 0 | COMMUNITY

## 2017-10-18 RX ORDER — BENZOCAINE AND MENTHOL 5; 1 G/100ML; G/100ML
1 LIQUID ORAL
Qty: 0 | Refills: 0 | Status: DISCONTINUED | OUTPATIENT
Start: 2017-10-18 | End: 2017-10-19

## 2017-10-18 RX ORDER — IBUPROFEN 200 MG
600 TABLET ORAL ONCE
Qty: 0 | Refills: 0 | Status: COMPLETED | OUTPATIENT
Start: 2017-10-18 | End: 2017-10-18

## 2017-10-18 RX ORDER — IPRATROPIUM/ALBUTEROL SULFATE 18-103MCG
3 AEROSOL WITH ADAPTER (GRAM) INHALATION ONCE
Qty: 0 | Refills: 0 | Status: COMPLETED | OUTPATIENT
Start: 2017-10-18 | End: 2017-10-18

## 2017-10-18 RX ORDER — ASPIRIN/CALCIUM CARB/MAGNESIUM 324 MG
1 TABLET ORAL
Qty: 0 | Refills: 0 | COMMUNITY

## 2017-10-18 RX ORDER — INSULIN LISPRO 100/ML
VIAL (ML) SUBCUTANEOUS EVERY 4 HOURS
Qty: 0 | Refills: 0 | Status: DISCONTINUED | OUTPATIENT
Start: 2017-10-18 | End: 2017-10-19

## 2017-10-18 RX ORDER — PIPERACILLIN AND TAZOBACTAM 4; .5 G/20ML; G/20ML
3.38 INJECTION, POWDER, LYOPHILIZED, FOR SOLUTION INTRAVENOUS EVERY 8 HOURS
Qty: 0 | Refills: 0 | Status: DISCONTINUED | OUTPATIENT
Start: 2017-10-18 | End: 2017-10-19

## 2017-10-18 RX ORDER — PIPERACILLIN AND TAZOBACTAM 4; .5 G/20ML; G/20ML
3.38 INJECTION, POWDER, LYOPHILIZED, FOR SOLUTION INTRAVENOUS EVERY 12 HOURS
Qty: 0 | Refills: 0 | Status: DISCONTINUED | OUTPATIENT
Start: 2017-10-18 | End: 2017-10-18

## 2017-10-18 RX ORDER — ATORVASTATIN CALCIUM 80 MG/1
20 TABLET, FILM COATED ORAL AT BEDTIME
Qty: 0 | Refills: 0 | Status: DISCONTINUED | OUTPATIENT
Start: 2017-10-18 | End: 2017-10-22

## 2017-10-18 RX ADMIN — Medication 250 MILLIGRAM(S): at 00:13

## 2017-10-18 RX ADMIN — Medication 3 MILLILITER(S): at 21:27

## 2017-10-18 RX ADMIN — INSULIN GLARGINE 30 UNIT(S): 100 INJECTION, SOLUTION SUBCUTANEOUS at 12:20

## 2017-10-18 RX ADMIN — SODIUM CHLORIDE 75 MILLILITER(S): 9 INJECTION, SOLUTION INTRAVENOUS at 14:56

## 2017-10-18 RX ADMIN — HEPARIN SODIUM 5000 UNIT(S): 5000 INJECTION INTRAVENOUS; SUBCUTANEOUS at 05:13

## 2017-10-18 RX ADMIN — PIPERACILLIN AND TAZOBACTAM 25 GRAM(S): 4; .5 INJECTION, POWDER, LYOPHILIZED, FOR SOLUTION INTRAVENOUS at 05:13

## 2017-10-18 RX ADMIN — PANTOPRAZOLE SODIUM 40 MILLIGRAM(S): 20 TABLET, DELAYED RELEASE ORAL at 12:20

## 2017-10-18 RX ADMIN — Medication 8: at 21:17

## 2017-10-18 RX ADMIN — Medication 4: at 18:35

## 2017-10-18 RX ADMIN — Medication 44 MICROGRAM(S): at 05:13

## 2017-10-18 RX ADMIN — HEPARIN SODIUM 5000 UNIT(S): 5000 INJECTION INTRAVENOUS; SUBCUTANEOUS at 14:06

## 2017-10-18 RX ADMIN — PIPERACILLIN AND TAZOBACTAM 25 GRAM(S): 4; .5 INJECTION, POWDER, LYOPHILIZED, FOR SOLUTION INTRAVENOUS at 18:34

## 2017-10-18 RX ADMIN — Medication 600 MILLIGRAM(S): at 19:53

## 2017-10-18 RX ADMIN — SODIUM CHLORIDE 150 MILLILITER(S): 9 INJECTION, SOLUTION INTRAVENOUS at 06:45

## 2017-10-18 RX ADMIN — INSULIN HUMAN 8 UNIT(S)/HR: 100 INJECTION, SOLUTION SUBCUTANEOUS at 10:22

## 2017-10-18 RX ADMIN — HEPARIN SODIUM 5000 UNIT(S): 5000 INJECTION INTRAVENOUS; SUBCUTANEOUS at 21:17

## 2017-10-18 RX ADMIN — BENZOCAINE AND MENTHOL 1 LOZENGE: 5; 1 LIQUID ORAL at 18:22

## 2017-10-18 RX ADMIN — Medication 2: at 15:42

## 2017-10-18 RX ADMIN — Medication 81 MILLIGRAM(S): at 12:20

## 2017-10-18 RX ADMIN — ATORVASTATIN CALCIUM 20 MILLIGRAM(S): 80 TABLET, FILM COATED ORAL at 21:17

## 2017-10-18 RX ADMIN — Medication 600 MILLIGRAM(S): at 20:22

## 2017-10-18 NOTE — PROGRESS NOTE ADULT - ASSESSMENT
Pt is a 62 year old M w/ a PMH of type 1 DM w/ retinopathy (A1C 7.7), HTN, and HLD that presents with hyperglycemia. Found to be severely acidotic with hyperglycemia. Also with elevated lactate and WBC with no clear source of infection. Admitted to MICU for DKA.

## 2017-10-18 NOTE — DIETITIAN INITIAL EVALUATION ADULT. - ENERGY NEEDS
Ht: 73"  Wt: 218   BMI:28.7  kg/m2   IBW: 184 (+/-10%)     118% IBW  Edema: none    Skin: no pressure injuries

## 2017-10-18 NOTE — PROGRESS NOTE ADULT - ATTENDING COMMENTS
Agree with assessment and plan as above by Dr. Major. Reviewed all pertinent labs, glucose values, and imaging studies. Modifications made as indicated above.     Liam Cruz D.O  [phone]

## 2017-10-18 NOTE — PROGRESS NOTE ADULT - PROBLEM SELECTOR PLAN 3
-holding enalapril in setting of LEONARD. (pt denies hx of CKD- as per MICU crt 1.4 6/2017) Likely dehyrated  -holding amlodopine with currently low BP  -monitor BP.

## 2017-10-18 NOTE — DIETITIAN INITIAL EVALUATION ADULT. - ADHERENCE
Pt has an insulin pump, unable to give specifics at this time. Stated that he tries to bolus insulin based on carbs he will be consuming, stated that it is difficult to do accurately when he goes out to eat. Reported that he "really doesn't have a good handle on carb counting". Stated last A1c at endo's office was 7.3, current A1c 7.7. Pt deferred diet education at this time as he wasn't feeling well.

## 2017-10-18 NOTE — DIETITIAN INITIAL EVALUATION ADULT. - OTHER INFO
Pt seen for: MICU length of stay.   Adm dx: DKA, LEONARD 2/2 DKA.   GI issues: denies N/V/D/C    Food allergies: NKFA    Vit/supplement PTA: fish oil, mvi, glucosamine

## 2017-10-18 NOTE — PROGRESS NOTE ADULT - PROBLEM SELECTOR PLAN 1
-controlled with A1C 7.7  -patient currently on insulin gtt at rate of 7ml/hr. FS ranging 200-270. D5/NS at 150cc/hr started today 6AM at FS 260s.  -AG closed since 5AM and currently 13.   -Acidosis improving with pH 7.27. Lactic acidosis resolved.  -Patient clinically improving. If next AG closed and patient able to tolerate PO would start basal/bolus. -controlled with A1C 7.7  -patient currently on insulin gtt at rate of 7ml/hr. FS ranging 200-270. D5/NS at 150cc/hr started today 6AM at FS 260s.  -AG closed since 5AM and currently 12.   -Acidosis improving with pH 7.27. Lactic acidosis resolved.  -Patient clinically improving. If next AG closed and patient able to tolerate PO would start basal/bolus. -uncontrolled with A1C 7.7  -patient currently on insulin gtt at rate of 7ml/hr. FS ranging 200-270. D5/NS at 150cc/hr started today 6AM at FS 260s.  -AG closed since 5AM and currently 12.   -Acidosis improving with pH 7.27. Lactic acidosis resolved.  -Patient clinically improving. If next AG closed and patient able to tolerate PO would start basal/bolus. Lantus 30 and Humalog 5 with meals +low correction  Discontinue D5 IVF if eating.   Plan to restart pump once wife brings supplies in  Plan to f/u with Endo in [place].

## 2017-10-18 NOTE — PROGRESS NOTE ADULT - SUBJECTIVE AND OBJECTIVE BOX
HPI:    62 year old M w/ a PMH of type 1 DM, HTN, and HLD that presents with hyperglycemia. As per the patient he is usually managed well on a insulin pump that is controlled by a external controller that tells him when his pump is empty. 2 days PTA misplaced his controller while visiting [place].  He attempted to give himself a bolus of insulin at night, however, in the morning his finger stick was still "high" and he was feeling confused and disoriented He was able to call EMS, and on their arrival the patient was confused so he was bought in the [facility].    In the ED the patients vitals were: 97.0, 81, 103/54, 20, 100% on RA. He was found to have a FS that was "high." He was given 10 units of subcutaneous regular insulin. He then had a BMP which showed a anion gap of 41 with a bicarb of 5. pH on VBG was 6.99 and there was acetone in the blood. WBC was 34 and lactate was 6.3.     INTERVAL HPI/OVERNIGHT EVENTS:  Patient was resistant to insulin DKA protocol overnight, requiring much higher dose of insulin starting at 30, decreasing to 8 now. Currently he is much more awake, however complains that he cannot lift up his head and feels too weak. FS trending down.     CONSTITUTIONAL: +weakness, no fevers or chills  EYES/ENT: No visual changes;  No vertigo or throat pain   RESPIRATORY: No cough, wheezing, hemoptysis; No shortness of breath  CARDIOVASCULAR: No chest pain or palpitations  GASTROINTESTINAL: Abdominal pain resolved.   GENITOURINARY: No dysuria, frequency or hematuria  NEUROLOGICAL: No numbness or weakness  SKIN: No itching, rashes    OBJECTIVE:    VITAL SIGNS:  ICU Vital Signs Last 24 Hrs  T(C): 37.1 (18 Oct 2017 08:00), Max: 37.1 (18 Oct 2017 08:00)  T(F): 98.7 (18 Oct 2017 08:00), Max: 98.7 (18 Oct 2017 08:00)  HR: 85 (18 Oct 2017 08:00) (78 - 92)  BP: 124/57 (18 Oct 2017 08:00) (90/54 - 127/91)  BP(mean): 81 (18 Oct 2017 08:00) (68 - 106)  ABP: --  ABP(mean): --  RR: 21 (18 Oct 2017 08:00) (11 - 24)  SpO2: 93% (18 Oct 2017 08:00) (92% - 100%)        10-17 @ 07:01  -  10-18 @ 07:00  --------------------------------------------------------  IN: 6980 mL / OUT: 1400 mL / NET: 5580 mL      CAPILLARY BLOOD GLUCOSE  257 (18 Oct 2017 08:00)      POCT Blood Glucose.: 257 mg/dL (18 Oct 2017 08:01)      PHYSICAL EXAM:    General: NAD - drowsy but arousable   HEENT: NCAT; PERRL, clear conjunctiva, neck supple, clear oropharynx   Respiratory: CTA b/l, no crackles, wheeze, rhonchi   Cardiovascular: RRR, nl s1/s2, no murmurs, gallops, regurg   Abdomen: soft, NT/ND; +BS x4  Extremities: WWP, 2+ peripheral pulses b/l; Mild LE edema b/l   Skin: normal color and turgor; no rash    MEDICATIONS:  MEDICATIONS  (STANDING):  aspirin  chewable 81 milliGRAM(s) Oral daily  atorvastatin 20 milliGRAM(s) Oral at bedtime  dextrose 5% + sodium chloride 0.9%. 1000 milliLiter(s) (150 mL/Hr) IV Continuous <Continuous>  heparin  Injectable 5000 Unit(s) SubCutaneous every 8 hours  insulin Infusion 8 Unit(s)/Hr (8 mL/Hr) IV Continuous <Continuous>  levothyroxine Injectable 44 MICROGram(s) IV Push daily  pantoprazole  Injectable 40 milliGRAM(s) IV Push daily  piperacillin/tazobactam IVPB. 3.375 Gram(s) IV Intermittent every 12 hours    MEDICATIONS  (PRN):      ALLERGIES:  Allergies    No Known Allergies    Intolerances        LABS:                        12.5   26.3  )-----------( 251      ( 18 Oct 2017 03:51 )             35.0     10-18    141  |  107  |  71<H>  ----------------------------<  379<H>  4.2   |  19<L>  |  3.38<H>    Ca    7.7<L>      18 Oct 2017 05:09  Phos  2.8     10-18  Mg     1.9     10-18    TPro  6.5  /  Alb  4.2  /  TBili  0.3  /  DBili  x   /  AST  37  /  ALT  30  /  AlkPhos  75  10-17    PT/INR - ( 18 Oct 2017 03:51 )   PT: 12.7 sec;   INR: 1.17 ratio         PTT - ( 18 Oct 2017 03:51 )  PTT:20.4 sec  Urinalysis Basic - ( 17 Oct 2017 16:50 )    Color: Yellow / Appearance: Clear / S.018 / pH: x  Gluc: x / Ketone: Small  / Bili: Negative / Urobili: Negative   Blood: x / Protein: Trace / Nitrite: Negative   Leuk Esterase: Negative / RBC: 5-10 /HPF / WBC 5-10 /HPF   Sq Epi: x / Non Sq Epi: x / Bacteria: x        RADIOLOGY & ADDITIONAL TESTS: Reviewed.

## 2017-10-18 NOTE — PROGRESS NOTE ADULT - SUBJECTIVE AND OBJECTIVE BOX
Chief Complaint: hyperglycemia/ DKA    History: Patient reports feeling better but still mildy lethargic. No N/V, abd pain, D/C, fevers, cough, CP, SOB.     MEDICATIONS  (STANDING):  aspirin  chewable 81 milliGRAM(s) Oral daily  dextrose 5% + sodium chloride 0.9%. 1000 milliLiter(s) (150 mL/Hr) IV Continuous <Continuous>  heparin  Injectable 5000 Unit(s) SubCutaneous every 8 hours  insulin Infusion 8 Unit(s)/Hr (8 mL/Hr) IV Continuous <Continuous>  levothyroxine Injectable 44 MICROGram(s) IV Push daily  pantoprazole  Injectable 40 milliGRAM(s) IV Push daily  piperacillin/tazobactam IVPB. 3.375 Gram(s) IV Intermittent every 12 hours    MEDICATIONS  (PRN):      Allergies    No Known Allergies    Intolerances    PHYSICAL EXAM:  VITALS: T(C): 37.1 (10-18-17 @ 08:00)  T(F): 98.7 (10-18-17 @ 08:00), Max: 98.7 (10-18-17 @ 08:00)  HR: 79 (10-18-17 @ 10:00) (78 - 92)  BP: 103/56 (10-18-17 @ 10:00) (90/54 - 127/91)  RR:  (11 - 24)  SpO2:  (92% - 100%)    GENERAL: Patient in no acute distress. Mild lethargy.   EYES: No proptosis, no lid lag, anicteric  HEENT:  Atraumatic, Normocephalic, dry mucous membranes  RESPIRATORY: Clear to auscultation bilaterally; No rales, rhonchi, wheezing  CARDIOVASCULAR: Regular rate and rhythm; No murmurs; no peripheral edema  GI: Soft, nontender, non distended, normal bowel sounds  SKIN: Dry, intact, No rashes or lesions on LE and feet b/l  MUSCULOSKELETAL: Full range of motion, generalized weakness  NEURO: sensation intact, extraocular movements intact, no tremor  PSYCH: Alert and oriented x 3, but lethargic.       CAPILLARY BLOOD GLUCOSE  203 (18 Oct 2017 10:00)  225 (18 Oct 2017 09:00)  257 (18 Oct 2017 08:00)  272 (18 Oct 2017 07:00)  266 (18 Oct 2017 06:00)  345 (18 Oct 2017 05:00)  384 (18 Oct 2017 04:00)  415 (18 Oct 2017 03:00)  458 (18 Oct 2017 02:00)  494 (18 Oct 2017 01:00)  592 (18 Oct 2017 00:00)  608 (17 Oct 2017 23:00)  672 (17 Oct 2017 22:00)  767 (17 Oct 2017 21:00)  797 (17 Oct 2017 20:00)  843 (17 Oct 2017 19:00)  845 (17 Oct 2017 18:00)  922 (17 Oct 2017 17:00)      LABS  10-18    142  |  109<H>  |  69<H>  ----------------------------<  267<H>  4.2   |  20<L>  |  3.10<H>    EGFR if : 23<L>  EGFR if non : 20<L>    Ca    7.9<L>      10-18  Mg     1.9     10-18  Phos  3.1     10-18    TPro  6.5  /  Alb  4.2  /  TBili  0.3  /  DBili  x   /  AST  37  /  ALT  30  /  AlkPhos  75  10-17          Thyroid Function Tests:  10-17 @ 12:42 TSH 2.43 FreeT4 -- T3 -- Anti TPO -- Anti Thyroglobulin Ab -- TSI --      Hemoglobin A1C, Whole Blood: 7.7 % <H> [4.0 - 5.6] (10-17-17 @ 20:23) Chief Complaint: hyperglycemia/ DKA    History: Patient reports feeling better but still mildy lethargic. No N/V, abd pain, D/C, fevers, cough, CP, SOB. Given Lantus 30 units this afternoon. Now off insulin gtt. Pump and supplies not available at this time.      MEDICATIONS  (STANDING):  aspirin  chewable 81 milliGRAM(s) Oral daily  dextrose 5% + sodium chloride 0.9%. 1000 milliLiter(s) (150 mL/Hr) IV Continuous <Continuous>  heparin  Injectable 5000 Unit(s) SubCutaneous every 8 hours  insulin Infusion 8 Unit(s)/Hr (8 mL/Hr) IV Continuous <Continuous>  levothyroxine Injectable 44 MICROGram(s) IV Push daily  pantoprazole  Injectable 40 milliGRAM(s) IV Push daily  piperacillin/tazobactam IVPB. 3.375 Gram(s) IV Intermittent every 12 hours    MEDICATIONS  (PRN):      Allergies    No Known Allergies    Intolerances    PHYSICAL EXAM:  VITALS: T(C): 37.1 (10-18-17 @ 08:00)  T(F): 98.7 (10-18-17 @ 08:00), Max: 98.7 (10-18-17 @ 08:00)  HR: 79 (10-18-17 @ 10:00) (78 - 92)  BP: 103/56 (10-18-17 @ 10:00) (90/54 - 127/91)  RR:  (11 - 24)  SpO2:  (92% - 100%)    GENERAL: Patient in no acute distress. Mild lethargy.   EYES: No proptosis, no lid lag, anicteric  HEENT:  Atraumatic, Normocephalic, dry mucous membranes  RESPIRATORY: Clear to auscultation bilaterally; No rales, rhonchi, wheezing  CARDIOVASCULAR: Regular rate and rhythm; No murmurs; no peripheral edema  GI: Soft, nontender, non distended, normal bowel sounds  SKIN: Dry, intact, No rashes or lesions on LE and feet b/l  MUSCULOSKELETAL: Full range of motion, generalized weakness  NEURO: sensation intact, extraocular movements intact, no tremor  PSYCH: Alert and oriented x 3, but lethargic.       CAPILLARY BLOOD GLUCOSE  203 (18 Oct 2017 10:00)  225 (18 Oct 2017 09:00)  257 (18 Oct 2017 08:00)  272 (18 Oct 2017 07:00)  266 (18 Oct 2017 06:00)  345 (18 Oct 2017 05:00)  384 (18 Oct 2017 04:00)  415 (18 Oct 2017 03:00)  458 (18 Oct 2017 02:00)  494 (18 Oct 2017 01:00)  592 (18 Oct 2017 00:00)  608 (17 Oct 2017 23:00)  672 (17 Oct 2017 22:00)  767 (17 Oct 2017 21:00)  797 (17 Oct 2017 20:00)  843 (17 Oct 2017 19:00)  845 (17 Oct 2017 18:00)  922 (17 Oct 2017 17:00)      LABS  10-18    142  |  109<H>  |  69<H>  ----------------------------<  267<H>  4.2   |  20<L>  |  3.10<H>    EGFR if : 23<L>  EGFR if non : 20<L>    Ca    7.9<L>      10-18  Mg     1.9     10-18  Phos  3.1     10-18    TPro  6.5  /  Alb  4.2  /  TBili  0.3  /  DBili  x   /  AST  37  /  ALT  30  /  AlkPhos  75  10-17          Thyroid Function Tests:  10-17 @ 12:42 TSH 2.43 FreeT4 -- T3 -- Anti TPO -- Anti Thyroglobulin Ab -- TSI --      Hemoglobin A1C, Whole Blood: 7.7 % <H> [4.0 - 5.6] (10-17-17 @ 20:23)

## 2017-10-18 NOTE — PROGRESS NOTE ADULT - ASSESSMENT
62 year old M w/ a PMH of type 1 DM, HTN, and HLD that presenting with hyperglycemia admitted to MICU for DKA with initial anion gap of 41 with a bicarb of 5, pH 6.99 and leukocytosis    #Neuro:     No active issues though patient is drowsy, complains of feeling tired.     #CVS:     Elevated cardiac enzymes today, trop 0.36, EKG unremarkable. Pt denies chest pain   - Trend Cardiac enzymes w EKG. Hold off on heparin gtt for now as low supicion for ACS , CK elevation much higher than trop concerning for rhabdo    - hold lipitor    HTN    - bp stable off home antihypertensives. Continue to monitor, restart medications whne bp increases     #Resp:    Breathing comfortably on O2 by NC, monitor resp status. No active issues at this time    #GI:     NPO for DKA , abd pain resolved, will start diet once FS <200     #Renal    LEONARD on CKD  Cr 1.44 in 6/2017.  Likely secondary to DKA related dehydration.   UO 65cc/hr   Monitor. Send urine lytes. C/w IV fluids.     #Heme    Leukocytosis : cultures sent, pending. c/w zosyn for broad spec coverage.    DVT ppx: heparin sq     #Endo    DKA:  C/w patient specific insulin protocol. Endo consult appreciated.  Q1 FS and insulin adjustment.     Hypothyroidism: cw synthroid

## 2017-10-18 NOTE — PROGRESS NOTE ADULT - PROBLEM SELECTOR PLAN 4
-holding atorvastatin with elevated CK levels and concern about rhabdo, though also uptrending trop and CKMB.  care as per MICU team.

## 2017-10-18 NOTE — PROGRESS NOTE ADULT - ATTENDING COMMENTS
62 M PMHx DM1, HTN, HLD admitted with DKA, sepsis, bacteremia vs contaminant, LEONARD, dehydration, rhabdomyolysis, elevated troponin. Overall improving. Give lantus, d/c iv insulin after 2 hours, start ISS. Endocrine f/u. Elevated Dana likely demand, no ischemic EKG changes and no symptoms. Check ECHO. Continue ivf, may need to d/c dextrose if glu increases off insulin drip. CT A/P reviewed, no acute findings. BCx likely contaminant, repeat BCx today, continue zosyn for now. D/c domingo. OOB. Start diet. Likely transfer out of MICU in pm. 62 M PMHx DM1, HTN, HLD admitted with DKA, sepsis, bacteremia vs contaminant, LEONARD, dehydration, rhabdomyolysis, elevated troponin. Overall improving. Give lantus, d/c iv insulin after 2 hours, start ISS. Endocrine f/u. Elevated Dana likely demand, no ischemic EKG changes and no symptoms. Check ECHO. Continue ivf, may need to d/c dextrose if glu increases off insulin drip. CT A/P reviewed, no acute findings. BCx likely contaminant, repeat BCx today, continue zosyn for now. Renal fn improving, UO adequate, d/c domingo. OOB. Start diet. Likely transfer out of MICU in pm.

## 2017-10-19 LAB
ACETONE SERPL-MCNC: SIGNIFICANT CHANGE UP
ALBUMIN SERPL ELPH-MCNC: 3.4 G/DL — SIGNIFICANT CHANGE UP (ref 3.3–5)
ALP SERPL-CCNC: 69 U/L — SIGNIFICANT CHANGE UP (ref 40–120)
ALT FLD-CCNC: 43 U/L RC — SIGNIFICANT CHANGE UP (ref 10–45)
ANION GAP SERPL CALC-SCNC: 12 MMOL/L — SIGNIFICANT CHANGE UP (ref 5–17)
ANION GAP SERPL CALC-SCNC: 12 MMOL/L — SIGNIFICANT CHANGE UP (ref 5–17)
ANION GAP SERPL CALC-SCNC: 17 MMOL/L — SIGNIFICANT CHANGE UP (ref 5–17)
AST SERPL-CCNC: 49 U/L — HIGH (ref 10–40)
B-OH-BUTYR SERPL-SCNC: 0.7 MMOL/L — HIGH
BASE EXCESS BLDV CALC-SCNC: -5.4 MMOL/L — LOW (ref -2–2)
BILIRUB SERPL-MCNC: 0.3 MG/DL — SIGNIFICANT CHANGE UP (ref 0.2–1.2)
BUN SERPL-MCNC: 44 MG/DL — HIGH (ref 7–23)
BUN SERPL-MCNC: 49 MG/DL — HIGH (ref 7–23)
BUN SERPL-MCNC: 56 MG/DL — HIGH (ref 7–23)
CA-I SERPL-SCNC: 1.21 MMOL/L — SIGNIFICANT CHANGE UP (ref 1.12–1.3)
CALCIUM SERPL-MCNC: 8.4 MG/DL — SIGNIFICANT CHANGE UP (ref 8.4–10.5)
CALCIUM SERPL-MCNC: 8.6 MG/DL — SIGNIFICANT CHANGE UP (ref 8.4–10.5)
CALCIUM SERPL-MCNC: 8.7 MG/DL — SIGNIFICANT CHANGE UP (ref 8.4–10.5)
CHLORIDE BLDV-SCNC: 115 MMOL/L — HIGH (ref 96–108)
CHLORIDE SERPL-SCNC: 110 MMOL/L — HIGH (ref 96–108)
CHLORIDE SERPL-SCNC: 112 MMOL/L — HIGH (ref 96–108)
CHLORIDE SERPL-SCNC: 113 MMOL/L — HIGH (ref 96–108)
CK MB BLD-MCNC: 2.3 % — SIGNIFICANT CHANGE UP (ref 0–3.5)
CK MB CFR SERPL CALC: 16.7 NG/ML — HIGH (ref 0–6.7)
CK SERPL-CCNC: 723 U/L — HIGH (ref 30–200)
CO2 BLDV-SCNC: 21 MMOL/L — LOW (ref 22–30)
CO2 SERPL-SCNC: 17 MMOL/L — LOW (ref 22–31)
CO2 SERPL-SCNC: 21 MMOL/L — LOW (ref 22–31)
CO2 SERPL-SCNC: 22 MMOL/L — SIGNIFICANT CHANGE UP (ref 22–31)
CREAT SERPL-MCNC: 1.79 MG/DL — HIGH (ref 0.5–1.3)
CREAT SERPL-MCNC: 1.98 MG/DL — HIGH (ref 0.5–1.3)
CREAT SERPL-MCNC: 2.22 MG/DL — HIGH (ref 0.5–1.3)
CULTURE RESULTS: SIGNIFICANT CHANGE UP
GAS PNL BLDV: 141 MMOL/L — SIGNIFICANT CHANGE UP (ref 136–145)
GAS PNL BLDV: SIGNIFICANT CHANGE UP
GLUCOSE BLDC GLUCOMTR-MCNC: 115 MG/DL — HIGH (ref 70–99)
GLUCOSE BLDC GLUCOMTR-MCNC: 117 MG/DL — HIGH (ref 70–99)
GLUCOSE BLDC GLUCOMTR-MCNC: 143 MG/DL — HIGH (ref 70–99)
GLUCOSE BLDC GLUCOMTR-MCNC: 161 MG/DL — HIGH (ref 70–99)
GLUCOSE BLDC GLUCOMTR-MCNC: 165 MG/DL — HIGH (ref 70–99)
GLUCOSE BLDC GLUCOMTR-MCNC: 183 MG/DL — HIGH (ref 70–99)
GLUCOSE BLDC GLUCOMTR-MCNC: 185 MG/DL — HIGH (ref 70–99)
GLUCOSE BLDC GLUCOMTR-MCNC: 204 MG/DL — HIGH (ref 70–99)
GLUCOSE BLDC GLUCOMTR-MCNC: 214 MG/DL — HIGH (ref 70–99)
GLUCOSE BLDC GLUCOMTR-MCNC: 220 MG/DL — HIGH (ref 70–99)
GLUCOSE BLDC GLUCOMTR-MCNC: 229 MG/DL — HIGH (ref 70–99)
GLUCOSE BLDC GLUCOMTR-MCNC: 248 MG/DL — HIGH (ref 70–99)
GLUCOSE BLDC GLUCOMTR-MCNC: 263 MG/DL — HIGH (ref 70–99)
GLUCOSE BLDC GLUCOMTR-MCNC: 288 MG/DL — HIGH (ref 70–99)
GLUCOSE BLDC GLUCOMTR-MCNC: 346 MG/DL — HIGH (ref 70–99)
GLUCOSE BLDC GLUCOMTR-MCNC: 367 MG/DL — HIGH (ref 70–99)
GLUCOSE BLDC GLUCOMTR-MCNC: 86 MG/DL — SIGNIFICANT CHANGE UP (ref 70–99)
GLUCOSE BLDV-MCNC: 375 MG/DL — HIGH (ref 70–99)
GLUCOSE SERPL-MCNC: 201 MG/DL — HIGH (ref 70–99)
GLUCOSE SERPL-MCNC: 258 MG/DL — HIGH (ref 70–99)
GLUCOSE SERPL-MCNC: 390 MG/DL — HIGH (ref 70–99)
HCO3 BLDV-SCNC: 20 MMOL/L — LOW (ref 21–29)
HCT VFR BLD CALC: 35.7 % — LOW (ref 39–50)
HCT VFR BLDA CALC: 37 % — LOW (ref 39–50)
HGB BLD CALC-MCNC: 12.1 G/DL — LOW (ref 13–17)
HGB BLD-MCNC: 12.3 G/DL — LOW (ref 13–17)
LACTATE BLDV-MCNC: 1.3 MMOL/L — SIGNIFICANT CHANGE UP (ref 0.7–2)
MAGNESIUM SERPL-MCNC: 1.9 MG/DL — SIGNIFICANT CHANGE UP (ref 1.6–2.6)
MAGNESIUM SERPL-MCNC: 2 MG/DL — SIGNIFICANT CHANGE UP (ref 1.6–2.6)
MAGNESIUM SERPL-MCNC: 2 MG/DL — SIGNIFICANT CHANGE UP (ref 1.6–2.6)
MCHC RBC-ENTMCNC: 33.5 PG — SIGNIFICANT CHANGE UP (ref 27–34)
MCHC RBC-ENTMCNC: 34.6 GM/DL — SIGNIFICANT CHANGE UP (ref 32–36)
MCV RBC AUTO: 96.9 FL — SIGNIFICANT CHANGE UP (ref 80–100)
MYOGLOBIN UR-MCNC: 46 MCG/L — HIGH
ORGANISM # SPEC MICROSCOPIC CNT: SIGNIFICANT CHANGE UP
ORGANISM # SPEC MICROSCOPIC CNT: SIGNIFICANT CHANGE UP
OTHER CELLS CSF MANUAL: 12 ML/DL — LOW (ref 18–22)
PCO2 BLDV: 38 MMHG — SIGNIFICANT CHANGE UP (ref 35–50)
PH BLDV: 7.33 — LOW (ref 7.35–7.45)
PHOSPHATE SERPL-MCNC: 2.2 MG/DL — LOW (ref 2.5–4.5)
PHOSPHATE SERPL-MCNC: 2.3 MG/DL — LOW (ref 2.5–4.5)
PHOSPHATE SERPL-MCNC: 2.9 MG/DL — SIGNIFICANT CHANGE UP (ref 2.5–4.5)
PLATELET # BLD AUTO: 192 K/UL — SIGNIFICANT CHANGE UP (ref 150–400)
PO2 BLDV: 42 MMHG — SIGNIFICANT CHANGE UP (ref 25–45)
POTASSIUM BLDV-SCNC: 4 MMOL/L — SIGNIFICANT CHANGE UP (ref 3.5–5)
POTASSIUM SERPL-MCNC: 4.2 MMOL/L — SIGNIFICANT CHANGE UP (ref 3.5–5.3)
POTASSIUM SERPL-MCNC: 4.2 MMOL/L — SIGNIFICANT CHANGE UP (ref 3.5–5.3)
POTASSIUM SERPL-MCNC: 4.3 MMOL/L — SIGNIFICANT CHANGE UP (ref 3.5–5.3)
POTASSIUM SERPL-SCNC: 4.2 MMOL/L — SIGNIFICANT CHANGE UP (ref 3.5–5.3)
POTASSIUM SERPL-SCNC: 4.2 MMOL/L — SIGNIFICANT CHANGE UP (ref 3.5–5.3)
POTASSIUM SERPL-SCNC: 4.3 MMOL/L — SIGNIFICANT CHANGE UP (ref 3.5–5.3)
PROT SERPL-MCNC: 6.2 G/DL — SIGNIFICANT CHANGE UP (ref 6–8.3)
RBC # BLD: 3.68 M/UL — LOW (ref 4.2–5.8)
RBC # FLD: 12 % — SIGNIFICANT CHANGE UP (ref 10.3–14.5)
SAO2 % BLDV: 75 % — SIGNIFICANT CHANGE UP (ref 67–88)
SODIUM SERPL-SCNC: 144 MMOL/L — SIGNIFICANT CHANGE UP (ref 135–145)
SODIUM SERPL-SCNC: 145 MMOL/L — SIGNIFICANT CHANGE UP (ref 135–145)
SODIUM SERPL-SCNC: 147 MMOL/L — HIGH (ref 135–145)
SPECIMEN SOURCE: SIGNIFICANT CHANGE UP
TROPONIN T SERPL-MCNC: 0.5 NG/ML — HIGH (ref 0–0.06)
WBC # BLD: 14.5 K/UL — HIGH (ref 3.8–10.5)
WBC # FLD AUTO: 14.5 K/UL — HIGH (ref 3.8–10.5)

## 2017-10-19 PROCEDURE — 93306 TTE W/DOPPLER COMPLETE: CPT | Mod: 26

## 2017-10-19 PROCEDURE — 99233 SBSQ HOSP IP/OBS HIGH 50: CPT | Mod: GC

## 2017-10-19 PROCEDURE — 99232 SBSQ HOSP IP/OBS MODERATE 35: CPT | Mod: GC

## 2017-10-19 RX ORDER — INSULIN HUMAN 100 [IU]/ML
4 INJECTION, SOLUTION SUBCUTANEOUS
Qty: 100 | Refills: 0 | Status: DISCONTINUED | OUTPATIENT
Start: 2017-10-19 | End: 2017-10-19

## 2017-10-19 RX ORDER — DEXTROSE 50 % IN WATER 50 %
25 SYRINGE (ML) INTRAVENOUS ONCE
Qty: 0 | Refills: 0 | Status: DISCONTINUED | OUTPATIENT
Start: 2017-10-19 | End: 2017-10-19

## 2017-10-19 RX ORDER — AMLODIPINE BESYLATE 2.5 MG/1
10 TABLET ORAL ONCE
Qty: 0 | Refills: 0 | Status: COMPLETED | OUTPATIENT
Start: 2017-10-19 | End: 2017-10-19

## 2017-10-19 RX ORDER — POTASSIUM PHOSPHATE, MONOBASIC POTASSIUM PHOSPHATE, DIBASIC 236; 224 MG/ML; MG/ML
15 INJECTION, SOLUTION INTRAVENOUS ONCE
Qty: 0 | Refills: 0 | Status: COMPLETED | OUTPATIENT
Start: 2017-10-19 | End: 2017-10-19

## 2017-10-19 RX ORDER — AMLODIPINE BESYLATE 2.5 MG/1
10 TABLET ORAL DAILY
Qty: 0 | Refills: 0 | Status: DISCONTINUED | OUTPATIENT
Start: 2017-10-20 | End: 2017-10-22

## 2017-10-19 RX ORDER — INSULIN GLARGINE 100 [IU]/ML
36 INJECTION, SOLUTION SUBCUTANEOUS ONCE
Qty: 0 | Refills: 0 | Status: DISCONTINUED | OUTPATIENT
Start: 2017-10-19 | End: 2017-10-19

## 2017-10-19 RX ORDER — DEXTROSE 50 % IN WATER 50 %
25 SYRINGE (ML) INTRAVENOUS ONCE
Qty: 0 | Refills: 0 | Status: DISCONTINUED | OUTPATIENT
Start: 2017-10-19 | End: 2017-10-22

## 2017-10-19 RX ORDER — SODIUM CHLORIDE 9 MG/ML
1000 INJECTION, SOLUTION INTRAVENOUS
Qty: 0 | Refills: 0 | Status: DISCONTINUED | OUTPATIENT
Start: 2017-10-19 | End: 2017-10-19

## 2017-10-19 RX ORDER — INSULIN GLARGINE 100 [IU]/ML
40 INJECTION, SOLUTION SUBCUTANEOUS ONCE
Qty: 0 | Refills: 0 | Status: COMPLETED | OUTPATIENT
Start: 2017-10-19 | End: 2017-10-19

## 2017-10-19 RX ORDER — GLUCAGON INJECTION, SOLUTION 0.5 MG/.1ML
1 INJECTION, SOLUTION SUBCUTANEOUS ONCE
Qty: 0 | Refills: 0 | Status: DISCONTINUED | OUTPATIENT
Start: 2017-10-19 | End: 2017-10-22

## 2017-10-19 RX ORDER — SODIUM CHLORIDE 9 MG/ML
100 INJECTION INTRAMUSCULAR; INTRAVENOUS; SUBCUTANEOUS ONCE
Qty: 0 | Refills: 0 | Status: DISCONTINUED | OUTPATIENT
Start: 2017-10-19 | End: 2017-10-19

## 2017-10-19 RX ORDER — INSULIN LISPRO 100/ML
VIAL (ML) SUBCUTANEOUS
Qty: 0 | Refills: 0 | Status: DISCONTINUED | OUTPATIENT
Start: 2017-10-19 | End: 2017-10-20

## 2017-10-19 RX ORDER — DEXTROSE 50 % IN WATER 50 %
12.5 SYRINGE (ML) INTRAVENOUS ONCE
Qty: 0 | Refills: 0 | Status: DISCONTINUED | OUTPATIENT
Start: 2017-10-19 | End: 2017-10-19

## 2017-10-19 RX ORDER — DEXTROSE 50 % IN WATER 50 %
1 SYRINGE (ML) INTRAVENOUS ONCE
Qty: 0 | Refills: 0 | Status: DISCONTINUED | OUTPATIENT
Start: 2017-10-19 | End: 2017-10-19

## 2017-10-19 RX ORDER — ACETAMINOPHEN 500 MG
1000 TABLET ORAL ONCE
Qty: 0 | Refills: 0 | Status: COMPLETED | OUTPATIENT
Start: 2017-10-19 | End: 2017-10-19

## 2017-10-19 RX ORDER — ASPIRIN/CALCIUM CARB/MAGNESIUM 324 MG
81 TABLET ORAL DAILY
Qty: 0 | Refills: 0 | Status: DISCONTINUED | OUTPATIENT
Start: 2017-10-19 | End: 2017-10-22

## 2017-10-19 RX ORDER — LABETALOL HCL 100 MG
100 TABLET ORAL THREE TIMES A DAY
Qty: 0 | Refills: 0 | Status: DISCONTINUED | OUTPATIENT
Start: 2017-10-19 | End: 2017-10-22

## 2017-10-19 RX ORDER — IPRATROPIUM/ALBUTEROL SULFATE 18-103MCG
3 AEROSOL WITH ADAPTER (GRAM) INHALATION ONCE
Qty: 0 | Refills: 0 | Status: COMPLETED | OUTPATIENT
Start: 2017-10-19 | End: 2017-10-19

## 2017-10-19 RX ORDER — DEXTROSE 50 % IN WATER 50 %
1 SYRINGE (ML) INTRAVENOUS ONCE
Qty: 0 | Refills: 0 | Status: DISCONTINUED | OUTPATIENT
Start: 2017-10-19 | End: 2017-10-22

## 2017-10-19 RX ORDER — LABETALOL HCL 100 MG
100 TABLET ORAL ONCE
Qty: 0 | Refills: 0 | Status: COMPLETED | OUTPATIENT
Start: 2017-10-19 | End: 2017-10-19

## 2017-10-19 RX ORDER — HUMAN INSULIN 100 [IU]/ML
6 INJECTION, SUSPENSION SUBCUTANEOUS ONCE
Qty: 0 | Refills: 0 | Status: COMPLETED | OUTPATIENT
Start: 2017-10-19 | End: 2017-10-19

## 2017-10-19 RX ORDER — DEXTROSE 50 % IN WATER 50 %
12.5 SYRINGE (ML) INTRAVENOUS ONCE
Qty: 0 | Refills: 0 | Status: DISCONTINUED | OUTPATIENT
Start: 2017-10-19 | End: 2017-10-22

## 2017-10-19 RX ORDER — INSULIN GLARGINE 100 [IU]/ML
35 INJECTION, SOLUTION SUBCUTANEOUS
Qty: 0 | Refills: 0 | Status: DISCONTINUED | OUTPATIENT
Start: 2017-10-19 | End: 2017-10-19

## 2017-10-19 RX ORDER — PIPERACILLIN AND TAZOBACTAM 4; .5 G/20ML; G/20ML
3.38 INJECTION, POWDER, LYOPHILIZED, FOR SOLUTION INTRAVENOUS EVERY 8 HOURS
Qty: 0 | Refills: 0 | Status: DISCONTINUED | OUTPATIENT
Start: 2017-10-19 | End: 2017-10-19

## 2017-10-19 RX ORDER — GLUCAGON INJECTION, SOLUTION 0.5 MG/.1ML
1 INJECTION, SOLUTION SUBCUTANEOUS ONCE
Qty: 0 | Refills: 0 | Status: DISCONTINUED | OUTPATIENT
Start: 2017-10-19 | End: 2017-10-19

## 2017-10-19 RX ORDER — SODIUM CHLORIDE 9 MG/ML
1000 INJECTION INTRAMUSCULAR; INTRAVENOUS; SUBCUTANEOUS
Qty: 0 | Refills: 0 | Status: DISCONTINUED | OUTPATIENT
Start: 2017-10-19 | End: 2017-10-19

## 2017-10-19 RX ORDER — BENZOCAINE AND MENTHOL 5; 1 G/100ML; G/100ML
1 LIQUID ORAL
Qty: 0 | Refills: 0 | Status: DISCONTINUED | OUTPATIENT
Start: 2017-10-19 | End: 2017-10-22

## 2017-10-19 RX ORDER — INSULIN LISPRO 100/ML
7 VIAL (ML) SUBCUTANEOUS
Qty: 0 | Refills: 0 | Status: DISCONTINUED | OUTPATIENT
Start: 2017-10-19 | End: 2017-10-20

## 2017-10-19 RX ORDER — INSULIN HUMAN 100 [IU]/ML
3 INJECTION, SOLUTION SUBCUTANEOUS
Qty: 100 | Refills: 0 | Status: DISCONTINUED | OUTPATIENT
Start: 2017-10-19 | End: 2017-10-19

## 2017-10-19 RX ORDER — BENZOCAINE AND MENTHOL 5; 1 G/100ML; G/100ML
1 LIQUID ORAL
Qty: 0 | Refills: 0 | Status: DISCONTINUED | OUTPATIENT
Start: 2017-10-19 | End: 2017-10-19

## 2017-10-19 RX ORDER — LEVOTHYROXINE SODIUM 125 MCG
88 TABLET ORAL DAILY
Qty: 0 | Refills: 0 | Status: DISCONTINUED | OUTPATIENT
Start: 2017-10-20 | End: 2017-10-22

## 2017-10-19 RX ADMIN — HUMAN INSULIN 6 UNIT(S): 100 INJECTION, SUSPENSION SUBCUTANEOUS at 01:59

## 2017-10-19 RX ADMIN — POTASSIUM PHOSPHATE, MONOBASIC POTASSIUM PHOSPHATE, DIBASIC 62.5 MILLIMOLE(S): 236; 224 INJECTION, SOLUTION INTRAVENOUS at 13:47

## 2017-10-19 RX ADMIN — Medication 44 MICROGRAM(S): at 05:30

## 2017-10-19 RX ADMIN — HEPARIN SODIUM 5000 UNIT(S): 5000 INJECTION INTRAVENOUS; SUBCUTANEOUS at 22:15

## 2017-10-19 RX ADMIN — Medication 3 MILLILITER(S): at 08:20

## 2017-10-19 RX ADMIN — Medication 7 UNIT(S): at 13:15

## 2017-10-19 RX ADMIN — Medication 100 MILLIGRAM(S): at 09:29

## 2017-10-19 RX ADMIN — HEPARIN SODIUM 5000 UNIT(S): 5000 INJECTION INTRAVENOUS; SUBCUTANEOUS at 05:30

## 2017-10-19 RX ADMIN — INSULIN GLARGINE 40 UNIT(S): 100 INJECTION, SOLUTION SUBCUTANEOUS at 10:51

## 2017-10-19 RX ADMIN — ATORVASTATIN CALCIUM 20 MILLIGRAM(S): 80 TABLET, FILM COATED ORAL at 22:15

## 2017-10-19 RX ADMIN — SODIUM CHLORIDE 100 MILLILITER(S): 9 INJECTION, SOLUTION INTRAVENOUS at 05:18

## 2017-10-19 RX ADMIN — Medication 400 MILLIGRAM(S): at 09:09

## 2017-10-19 RX ADMIN — AMLODIPINE BESYLATE 10 MILLIGRAM(S): 2.5 TABLET ORAL at 09:09

## 2017-10-19 RX ADMIN — Medication 2: at 13:15

## 2017-10-19 RX ADMIN — BENZOCAINE AND MENTHOL 1 LOZENGE: 5; 1 LIQUID ORAL at 05:30

## 2017-10-19 RX ADMIN — SODIUM CHLORIDE 100 MILLILITER(S): 9 INJECTION INTRAMUSCULAR; INTRAVENOUS; SUBCUTANEOUS at 03:42

## 2017-10-19 RX ADMIN — Medication 81 MILLIGRAM(S): at 12:47

## 2017-10-19 RX ADMIN — Medication 1000 MILLIGRAM(S): at 09:25

## 2017-10-19 RX ADMIN — Medication 7 UNIT(S): at 18:23

## 2017-10-19 RX ADMIN — INSULIN HUMAN 3 UNIT(S)/HR: 100 INJECTION, SOLUTION SUBCUTANEOUS at 04:06

## 2017-10-19 RX ADMIN — Medication 100 MILLIGRAM(S): at 16:46

## 2017-10-19 RX ADMIN — BENZOCAINE AND MENTHOL 1 LOZENGE: 5; 1 LIQUID ORAL at 12:47

## 2017-10-19 RX ADMIN — Medication 10: at 01:12

## 2017-10-19 RX ADMIN — PIPERACILLIN AND TAZOBACTAM 25 GRAM(S): 4; .5 INJECTION, POWDER, LYOPHILIZED, FOR SOLUTION INTRAVENOUS at 02:49

## 2017-10-19 RX ADMIN — HEPARIN SODIUM 5000 UNIT(S): 5000 INJECTION INTRAVENOUS; SUBCUTANEOUS at 13:19

## 2017-10-19 NOTE — PROGRESS NOTE ADULT - PROBLEM SELECTOR PLAN 2
-on IV synthroid 44mcg  -TSH WNL  -c/w current dose and transition to PO 88mcg daily when can tolerate

## 2017-10-19 NOTE — PROGRESS NOTE ADULT - ATTENDING COMMENTS
62 M PMHx DM1, HTN, HLD admitted with DKA, LEONARD, dehydration, rhabdomyolysis, elevated troponin (likely demand). Restarted on iv insulin overnight for worsening hyperglycemia and AG. AG now closed, will give lantus 40 units and d/c drip in 2 hours. Start 7 units premeal humalog, moderate ISS. Endocrine to resume pump. BCx likely contaminant, d/c zosyn. ECHO pending. Start aspirin 81 mg daily for suspected CAD, further cardiac w/u based on ECHO findings. Renal fn improving, domingo d/c'd. PT, OOB. Restart amlodipine, holding ACEI due to LEONARD. CPK improved, d/c ivf. Stable for medicine.

## 2017-10-19 NOTE — PROGRESS NOTE ADULT - SUBJECTIVE AND OBJECTIVE BOX
CHIEF COMPLAINT:  DKA    Interval Events:    FS trended up to 370, anion gap opened up to 17. Received NPH 6U and started back on insulin gtt @4U/hr.   C/o headache overnight, received ibuprofen 600mg.   Had temp of 100.4F at 8pm last night.   Currently c/o bitemporal headache, sharp, constant since yesterday. Usually takes advil at home for headaches. Ibuprofen overnight did not help.   Reports sore throat that started since he vomited during his DKA episode. Denies fevers, chills, nasal congestion, rhinorrhea.  Also has been wheezing since last night, received duoneb x 1.     REVIEW OF SYSTEMS:  Gen: Denies fevers, chills  HEENT: Denies oral ulcers  CV: Denies chest pain, palpitations  Resp: Denies SOB  Abd: Denies abdominal pain, N/V, melena  Skin: Denies rashes  Ext: Denies edema  Neuro: Denies headaches, visual changes  Psych: Denies depression  Endo: Denies heat intolerance      OBJECTIVE:  ICU Vital Signs Last 24 Hrs  T(C): 37.1 (19 Oct 2017 04:00), Max: 38 (18 Oct 2017 20:00)  T(F): 98.8 (19 Oct 2017 04:00), Max: 100.4 (18 Oct 2017 20:00)  HR: 88 (19 Oct 2017 07:00) (73 - 96)  BP: 167/81 (19 Oct 2017 07:00) (100/55 - 167/81)  BP(mean): 116 (19 Oct 2017 07:00) (73 - 118)  ABP: --  ABP(mean): --  RR: 20 (19 Oct 2017 07:00) (11 - 24)  SpO2: 94% (19 Oct 2017 07:00) (90% - 98%)        10-18 @ 07:01  -  10-19 @ 07:00  --------------------------------------------------------  IN: 2394 mL / OUT: 2250 mL / NET: 144 mL      CAPILLARY BLOOD GLUCOSE  263 (19 Oct 2017 07:00)      POCT Blood Glucose.: 263 mg/dL (19 Oct 2017 06:43)      PHYSICAL EXAM:  Gen: NAD  HEENT: mild oropharyngeal erythema without exudates, moist mucus membranes, no oral ulcers. No tenderness on palpation of bitemporal arteries  CV: +S1S2 RRR no murmurs, rubs or gallops  Chest: Transmitted upper airway sounds with expiratory wheezing, no crackles  Abd: BS+ in all 4 quadrants, Soft, NTND  Ext: no pedal edema  Neuro: AAOx3  Skin: No rashes      LINES:    HOSPITAL MEDICATIONS:  Standing Meds:  acetaminophen  IVPB. 1000 milliGRAM(s) IV Intermittent once  ALBUTerol/ipratropium for Nebulization. 3 milliLiter(s) Nebulizer once  amLODIPine   Tablet 10 milliGRAM(s) Oral once  aspirin  chewable 81 milliGRAM(s) Oral daily  atorvastatin 20 milliGRAM(s) Oral at bedtime  benzocaine 15 mG/menthol 3.6 mG Lozenge 1 Lozenge Oral two times a day  dextrose 5% + sodium chloride 0.45%. 1000 milliLiter(s) IV Continuous <Continuous>  heparin  Injectable 5000 Unit(s) SubCutaneous every 8 hours  insulin Infusion 4 Unit(s)/Hr IV Continuous <Continuous>  labetalol 100 milliGRAM(s) Oral once  levothyroxine Injectable 44 MICROGram(s) IV Push daily  pantoprazole  Injectable 40 milliGRAM(s) IV Push daily  piperacillin/tazobactam IVPB. 3.375 Gram(s) IV Intermittent every 8 hours      PRN Meds:      LABS:                        12.3   14.5  )-----------( 192      ( 19 Oct 2017 01:56 )             35.7     Hgb Trend: 12.3<--, 12.5<--, 12.1<--, 12.6<--  10-19    144  |  110<H>  |  56<H>  ----------------------------<  390<H>  4.3   |  17<L>  |  2.22<H>    Ca    8.4      19 Oct 2017 02:03  Phos  2.9     10-19  Mg     1.9     10-19    TPro  6.2  /  Alb  3.4  /  TBili  0.3  /  DBili  x   /  AST  49<H>  /  ALT  43  /  AlkPhos  69  10-19    Creatinine Trend: 2.22<--, 2.75<--, 3.10<--, 3.38<--, 3.69<--, 3.81<--  PT/INR - ( 18 Oct 2017 03:51 )   PT: 12.7 sec;   INR: 1.17 ratio         PTT - ( 18 Oct 2017 03:51 )  PTT:20.4 sec  Urinalysis Basic - ( 17 Oct 2017 16:50 )    Color: Yellow / Appearance: Clear / S.018 / pH: x  Gluc: x / Ketone: Small  / Bili: Negative / Urobili: Negative   Blood: x / Protein: Trace / Nitrite: Negative   Leuk Esterase: Negative / RBC: 5-10 /HPF / WBC 5-10 /HPF   Sq Epi: x / Non Sq Epi: x / Bacteria: x        Venous Blood Gas:  10-19 @ 01:54  7.33/38/42/  VBG Lactate: 1.3  Venous Blood Gas:  10-18 @ 13:28  7.32/42/28//49  VBG Lactate: 1.1  Venous Blood Gas:  10-18 @ 09:12  7.27/44/32/58  VBG Lactate: 2.0  Venous Blood Gas:  10-18 @ 05:08  7.29/42///56  VBG Lactate: 1.4  Venous Blood Gas:  10-18 @ 01:23  7.27/41/33/61  VBG Lactate: 2.0  Venous Blood Gas:  10-17 @ 23:22  7.26/38/32/62  VBG Lactate: 2.4  Venous Blood Gas:  10-17 @ 22:17  7.23/39/37/  VBG Lactate: 2.2  Venous Blood Gas:  10-17 @ 21:18  7.21/37/32/60  VBG Lactate: 2.5  Venous Blood Gas:  10-17 @ 20:19  7.18/36/36/  VBG Lactate: 2.7  Venous Blood Gas:  10-17 @ 18:58  7.15/34//  VBG Lactate: 2.5  Venous Blood Gas:  10-17 @ 18:20  7.11/34//10/81  VBG Lactate: 3.2  Venous Blood Gas:  10-17 @ 18:04  7.11/34/51/10/81  VBG Lactate: --  Venous Blood Gas:  10-17 @ 16:38  7.02/30/39/  VBG Lactate: 3.9  Venous Blood Gas:  10-17 @ 13:48  6.94/31/65/  VBG Lactate: 4.9  Venous Blood Gas:  10-17 @ 10:36  6.99/24/49/  VBG Lactate: 6.2      MICROBIOLOGY:   Bcx 10/17 bottle + Coag neg Staph     RADIOLOGY:  [x ] Reviewed and interpreted by me  CT A/P: No acute intraabdominal pathology CHIEF COMPLAINT:  DKA    Interval Events:    FS trended up to 370, anion gap opened up to 17. Received NPH 6U and started back on insulin gtt @4U/hr.   C/o headache overnight, received ibuprofen 600mg.   Currently c/o bitemporal headache, sharp, constant since yesterday. Usually takes advil at home for headaches. Ibuprofen overnight did not help.   Had temp of 100.4F at 8pm last night.   Reports sore throat that started since he vomited during his DKA episode. Denies fevers, chills, nasal congestion, rhinorrhea.  Also has been wheezing since last night, received duoneb x 1.     REVIEW OF SYSTEMS:  Gen: Denies fevers, chills  HEENT: Denies oral ulcers  CV: Denies chest pain, palpitations  Resp: Denies SOB  Abd: Denies abdominal pain, N/V, melena  Skin: Denies rashes  Ext: Denies edema  Neuro: Denies headaches, visual changes  Psych: Denies depression  Endo: Denies heat intolerance      OBJECTIVE:  ICU Vital Signs Last 24 Hrs  T(C): 37.1 (19 Oct 2017 04:00), Max: 38 (18 Oct 2017 20:00)  T(F): 98.8 (19 Oct 2017 04:00), Max: 100.4 (18 Oct 2017 20:00)  HR: 88 (19 Oct 2017 07:00) (73 - 96)  BP: 167/81 (19 Oct 2017 07:00) (100/55 - 167/81)  BP(mean): 116 (19 Oct 2017 07:00) (73 - 118)  ABP: --  ABP(mean): --  RR: 20 (19 Oct 2017 07:00) (11 - 24)  SpO2: 94% (19 Oct 2017 07:00) (90% - 98%)        10-18 @ 07:01  -  10-19 @ 07:00  --------------------------------------------------------  IN: 2394 mL / OUT: 2250 mL / NET: 144 mL      CAPILLARY BLOOD GLUCOSE  263 (19 Oct 2017 07:00)      POCT Blood Glucose.: 263 mg/dL (19 Oct 2017 06:43)      PHYSICAL EXAM:  Gen: NAD  HEENT: mild oropharyngeal erythema without exudates, moist mucus membranes, no oral ulcers. No tenderness on palpation of bitemporal arteries  CV: +S1S2 RRR no murmurs, rubs or gallops  Chest: Transmitted upper airway sounds with expiratory wheezing, no crackles  Abd: BS+ in all 4 quadrants, Soft, NTND  Ext: no pedal edema  Neuro: AAOx3  Skin: No rashes      LINES:    HOSPITAL MEDICATIONS:  Standing Meds:  acetaminophen  IVPB. 1000 milliGRAM(s) IV Intermittent once  ALBUTerol/ipratropium for Nebulization. 3 milliLiter(s) Nebulizer once  amLODIPine   Tablet 10 milliGRAM(s) Oral once  aspirin  chewable 81 milliGRAM(s) Oral daily  atorvastatin 20 milliGRAM(s) Oral at bedtime  benzocaine 15 mG/menthol 3.6 mG Lozenge 1 Lozenge Oral two times a day  dextrose 5% + sodium chloride 0.45%. 1000 milliLiter(s) IV Continuous <Continuous>  heparin  Injectable 5000 Unit(s) SubCutaneous every 8 hours  insulin Infusion 4 Unit(s)/Hr IV Continuous <Continuous>  labetalol 100 milliGRAM(s) Oral once  levothyroxine Injectable 44 MICROGram(s) IV Push daily  pantoprazole  Injectable 40 milliGRAM(s) IV Push daily  piperacillin/tazobactam IVPB. 3.375 Gram(s) IV Intermittent every 8 hours      PRN Meds:      LABS:                        12.3   14.5  )-----------( 192      ( 19 Oct 2017 01:56 )             35.7     Hgb Trend: 12.3<--, 12.5<--, 12.1<--, 12.6<--  10-19    144  |  110<H>  |  56<H>  ----------------------------<  390<H>  4.3   |  17<L>  |  2.22<H>    Ca    8.4      19 Oct 2017 02:03  Phos  2.9     10-19  Mg     1.9     10-19    TPro  6.2  /  Alb  3.4  /  TBili  0.3  /  DBili  x   /  AST  49<H>  /  ALT  43  /  AlkPhos  69  10-19    Creatinine Trend: 2.22<--, 2.75<--, 3.10<--, 3.38<--, 3.69<--, 3.81<--  PT/INR - ( 18 Oct 2017 03:51 )   PT: 12.7 sec;   INR: 1.17 ratio         PTT - ( 18 Oct 2017 03:51 )  PTT:20.4 sec  Urinalysis Basic - ( 17 Oct 2017 16:50 )    Color: Yellow / Appearance: Clear / S.018 / pH: x  Gluc: x / Ketone: Small  / Bili: Negative / Urobili: Negative   Blood: x / Protein: Trace / Nitrite: Negative   Leuk Esterase: Negative / RBC: 5-10 /HPF / WBC 5-10 /HPF   Sq Epi: x / Non Sq Epi: x / Bacteria: x        Venous Blood Gas:  10-19 @ 01:54  7.33/38/42/  VBG Lactate: 1.3  Venous Blood Gas:  10-18 @ 13:28  7.32/42/28//49  VBG Lactate: 1.1  Venous Blood Gas:  10-18 @ 09:12  7.27/44/32/58  VBG Lactate: 2.0  Venous Blood Gas:  10-18 @ 05:08  7.29/42///56  VBG Lactate: 1.4  Venous Blood Gas:  10-18 @ 01:23  7.27/41/33/61  VBG Lactate: 2.0  Venous Blood Gas:  10-17 @ 23:22  7.26/38/32/62  VBG Lactate: 2.4  Venous Blood Gas:  10-17 @ 22:17  7.23/39/37/  VBG Lactate: 2.2  Venous Blood Gas:  10-17 @ 21:18  7.21/37/32/60  VBG Lactate: 2.5  Venous Blood Gas:  10-17 @ 20:19  7.18/36/36/  VBG Lactate: 2.7  Venous Blood Gas:  10-17 @ 18:58  7.15/34//  VBG Lactate: 2.5  Venous Blood Gas:  10-17 @ 18:20  7.11/34//10/81  VBG Lactate: 3.2  Venous Blood Gas:  10-17 @ 18:04  7.11/34/51/10/81  VBG Lactate: --  Venous Blood Gas:  10-17 @ 16:38  7.02/30/39/  VBG Lactate: 3.9  Venous Blood Gas:  10-17 @ 13:48  6.94/31/65/  VBG Lactate: 4.9  Venous Blood Gas:  10-17 @ 10:36  6.99/24/49/  VBG Lactate: 6.2      MICROBIOLOGY:   Bcx 10/17 bottle + Coag neg Staph     RADIOLOGY:  [x ] Reviewed and interpreted by me  CT A/P: No acute intraabdominal pathology

## 2017-10-19 NOTE — PROGRESS NOTE ADULT - ATTENDING COMMENTS
Agree with assessment and plan as above by Dr. Major. Reviewed all pertinent labs, glucose values, and imaging studies. Modifications made as indicated above.     Liam Cruz D.O  [phone]

## 2017-10-19 NOTE — PROGRESS NOTE ADULT - PROBLEM SELECTOR PLAN 3
-holding enalapril in setting of LEONARD. (pt denies hx of CKD- as per MICU crt 1.4 6/2017) crt slowly downtrending.  -restarted amlodopine with pt hypertensive overnight.  -added on labetolol  -monitor BP.

## 2017-10-19 NOTE — PROGRESS NOTE ADULT - SUBJECTIVE AND OBJECTIVE BOX
Chief Complaint: hyperglycemia/ DKA    History: Patient reports feeling more lethargic today. No abd pain, N/V. Has mild non-productive cough. Fever 100.4 overnight. Was eating regular diet yesterday but hyperglycemia and AG 17 and now back on insulin gtt 4u/hr.     MEDICATIONS  (STANDING):  aspirin enteric coated 81 milliGRAM(s) Oral daily  atorvastatin 20 milliGRAM(s) Oral at bedtime  dextrose 50% Injectable 12.5 Gram(s) IV Push once  dextrose 50% Injectable 25 Gram(s) IV Push once  dextrose 50% Injectable 25 Gram(s) IV Push once  heparin  Injectable 5000 Unit(s) SubCutaneous every 8 hours  insulin Infusion 4 Unit(s)/Hr (4 mL/Hr) IV Continuous <Continuous>  insulin lispro (HumaLOG) corrective regimen sliding scale   SubCutaneous Before meals and at bedtime  insulin lispro Injectable (HumaLOG) 7 Unit(s) SubCutaneous three times a day before meals  labetalol 100 milliGRAM(s) Oral three times a day  levothyroxine Injectable 44 MICROGram(s) IV Push daily  pantoprazole  Injectable 40 milliGRAM(s) IV Push daily    MEDICATIONS  (PRN):  benzocaine 15 mG/menthol 3.6 mG Lozenge 1 Lozenge Oral four times a day PRN Sore Throat  dextrose Gel 1 Dose(s) Oral once PRN Blood Glucose LESS THAN 70 milliGRAM(s)/deciLiter  glucagon  Injectable 1 milliGRAM(s) IntraMuscular once PRN Glucose <70 milliGRAM(s)/deciLiter      Allergies    No Known Allergies    Intolerances    PHYSICAL EXAM:  Vital Signs Last 24 Hrs  T(C): 37.4 (19 Oct 2017 08:00), Max: 38 (18 Oct 2017 20:00)  T(F): 99.3 (19 Oct 2017 08:00), Max: 100.4 (18 Oct 2017 20:00)  HR: 85 (19 Oct 2017 11:00) (73 - 96)  BP: 135/72 (19 Oct 2017 11:00) (102/56 - 167/81)  BP(mean): 97 (19 Oct 2017 11:00) (74 - 118)  RR: 20 (19 Oct 2017 11:00) (11 - 24)  SpO2: 92% (19 Oct 2017 11:00) (90% - 98%)    GENERAL: Patient in no acute distress. Mild lethargy.   EYES: No proptosis, no lid lag, anicteric  HEENT:  Atraumatic, Normocephalic, dry mucous membranes  RESPIRATORY: Mild rhonchi b/l.   CARDIOVASCULAR: Regular rate and rhythm; No murmurs; no peripheral edema  GI: Soft, nontender, non distended, normal bowel sounds  SKIN: Dry, intact, No rashes or lesions on LE and feet b/l  MUSCULOSKELETAL: Full range of motion, generalized weakness  NEURO: sensation intact, extraocular movements intact, no tremor  PSYCH: Alert and oriented x 3, but lethargic.       CAPILLARY BLOOD GLUCOSE  204 (19 Oct 2017 11:00)      220 (19 Oct 2017 10:00)         Lantus 40U  214 (19 Oct 2017 09:00)  229 (19 Oct 2017 08:00)  263 (19 Oct 2017 07:00)  288 (19 Oct 2017 06:00)  248 (19 Oct 2017 05:00)  346 (19 Oct 2017 04:00)    Insulin gtt restarted  367 (19 Oct 2017 01:00)                             HISS 10U  + NPH 6U  319 (18 Oct 2017 22:00)                             HISS 8U  243 (18 Oct 2017 18:00)                             HISS 4U  186 (18 Oct 2017 15:00)                             HISS 2U  167 (18 Oct 2017 14:30)  203 (18 Oct 2017 14:00)  184 (18 Oct 2017 13:00)  175 (18 Oct 2017 12:00)         Lantus 30U      LABS  10-    145  |  112<H>  |  49<H>  ----------------------------<  258<H>  4.2   |  21<L>  |  1.98<H>  10-19    144  |  110<H>  |  56<H>  ----------------------------<  390<H>  4.3   |  17<L>  |  2.22<H>  10-18    144  |  112<H>  |  67<H>  ----------------------------<  199<H>  4.2   |  20<L>  |  2.75<H>    Ca    8.6      19 Oct 2017 08:24  Ca    8.4      19 Oct 2017 02:03  Ca    8.0<L>      18 Oct 2017 13:34  Phos  2.2     10-  Mg     2.0     10-    TPro  6.2  /  Alb  3.4  /  TBili  0.3  /  DBili  x   /  AST  49<H>  /  ALT  43  /  AlkPhos  69  10-  TPro  6.5  /  Alb  4.2  /  TBili  0.3  /  DBili  x   /  AST  37  /  ALT  30  /  AlkPhos  75  10-17      PT/INR - ( 18 Oct 2017 03:51 )   PT: 12.7 sec;   INR: 1.17 ratio         PTT - ( 18 Oct 2017 03:51 )  PTT:20.4 sec              Urinalysis Basic - ( 17 Oct 2017 16:50 )    Color: Yellow / Appearance: Clear / S.018 / pH: x  Gluc: x / Ketone: Small  / Bili: Negative / Urobili: Negative   Blood: x / Protein: Trace / Nitrite: Negative   Leuk Esterase: Negative / RBC: 5-10 /HPF / WBC 5-10 /HPF   Sq Epi: x / Non Sq Epi: x / Bacteria: x                              12.3   14.5  )-----------( 192      ( 19 Oct 2017 01:56 )             35.7                         12.5   26.3  )-----------( 251      ( 18 Oct 2017 03:51 )             35.0                         12.1   34.3  )-----------( 325      ( 17 Oct 2017 16:50 )             37.6 Chief Complaint: hyperglycemia/ DKA    History: Patient reports feeling more lethargic today. No abd pain, N/V. Has mild non-productive cough. Fever 100.4 overnight. Was eating regular diet yesterday but hyperglycemia and AG 17 and back on insulin gtt 4u/hr. Given Lantus 40 today now off insulin gtt. Reviewed omnipod brought in today with basal of about 38 units daily and I:C of 10 and Sensitivity of 25.     MEDICATIONS  (STANDING):  aspirin enteric coated 81 milliGRAM(s) Oral daily  atorvastatin 20 milliGRAM(s) Oral at bedtime  dextrose 50% Injectable 12.5 Gram(s) IV Push once  dextrose 50% Injectable 25 Gram(s) IV Push once  dextrose 50% Injectable 25 Gram(s) IV Push once  heparin  Injectable 5000 Unit(s) SubCutaneous every 8 hours  insulin Infusion 4 Unit(s)/Hr (4 mL/Hr) IV Continuous <Continuous>  insulin lispro (HumaLOG) corrective regimen sliding scale   SubCutaneous Before meals and at bedtime  insulin lispro Injectable (HumaLOG) 7 Unit(s) SubCutaneous three times a day before meals  labetalol 100 milliGRAM(s) Oral three times a day  levothyroxine Injectable 44 MICROGram(s) IV Push daily  pantoprazole  Injectable 40 milliGRAM(s) IV Push daily    MEDICATIONS  (PRN):  benzocaine 15 mG/menthol 3.6 mG Lozenge 1 Lozenge Oral four times a day PRN Sore Throat  dextrose Gel 1 Dose(s) Oral once PRN Blood Glucose LESS THAN 70 milliGRAM(s)/deciLiter  glucagon  Injectable 1 milliGRAM(s) IntraMuscular once PRN Glucose <70 milliGRAM(s)/deciLiter      Allergies    No Known Allergies    Intolerances    PHYSICAL EXAM:  Vital Signs Last 24 Hrs  T(C): 37.4 (19 Oct 2017 08:00), Max: 38 (18 Oct 2017 20:00)  T(F): 99.3 (19 Oct 2017 08:00), Max: 100.4 (18 Oct 2017 20:00)  HR: 85 (19 Oct 2017 11:00) (73 - 96)  BP: 135/72 (19 Oct 2017 11:00) (102/56 - 167/81)  BP(mean): 97 (19 Oct 2017 11:00) (74 - 118)  RR: 20 (19 Oct 2017 11:00) (11 - 24)  SpO2: 92% (19 Oct 2017 11:00) (90% - 98%)    GENERAL: Patient in no acute distress. Mild lethargy.   EYES: No proptosis, no lid lag, anicteric  HEENT:  Atraumatic, Normocephalic, dry mucous membranes  RESPIRATORY: Mild rhonchi b/l.   CARDIOVASCULAR: Regular rate and rhythm; No murmurs; no peripheral edema  GI: Soft, nontender, non distended, normal bowel sounds  SKIN: Dry, intact, No rashes or lesions on LE and feet b/l  MUSCULOSKELETAL: Full range of motion, generalized weakness  NEURO: sensation intact, extraocular movements intact, no tremor  PSYCH: Alert and oriented x 3, but lethargic.       CAPILLARY BLOOD GLUCOSE  204 (19 Oct 2017 11:00)      220 (19 Oct 2017 10:00)         Lantus 40U  214 (19 Oct 2017 09:00)  229 (19 Oct 2017 08:00)  263 (19 Oct 2017 07:00)  288 (19 Oct 2017 06:00)  248 (19 Oct 2017 05:00)  346 (19 Oct 2017 04:00)    Insulin gtt restarted  367 (19 Oct 2017 01:00)                             HISS 10U  + NPH 6U  319 (18 Oct 2017 22:00)                             HISS 8U  243 (18 Oct 2017 18:00)                             HISS 4U  186 (18 Oct 2017 15:00)                             HISS 2U  167 (18 Oct 2017 14:30)  203 (18 Oct 2017 14:00)  184 (18 Oct 2017 13:00)  175 (18 Oct 2017 12:00)         Lantus 30U      LABS  10-19    145  |  112<H>  |  49<H>  ----------------------------<  258<H>  4.2   |  21<L>  |  1.98<H>  10-19    144  |  110<H>  |  56<H>  ----------------------------<  390<H>  4.3   |  17<L>  |  2.22<H>  10-18    144  |  112<H>  |  67<H>  ----------------------------<  199<H>  4.2   |  20<L>  |  2.75<H>    Ca    8.6      19 Oct 2017 08:24  Ca    8.4      19 Oct 2017 02:03  Ca    8.0<L>      18 Oct 2017 13:34  Phos  2.2     10-19  Mg     2.0     10-19    TPro  6.2  /  Alb  3.4  /  TBili  0.3  /  DBili  x   /  AST  49<H>  /  ALT  43  /  AlkPhos  69  10-19  TPro  6.5  /  Alb  4.2  /  TBili  0.3  /  DBili  x   /  AST  37  /  ALT  30  /  AlkPhos  75  10      PT/INR - ( 18 Oct 2017 03:51 )   PT: 12.7 sec;   INR: 1.17 ratio         PTT - ( 18 Oct 2017 03:51 )  PTT:20.4 sec              Urinalysis Basic - ( 17 Oct 2017 16:50 )    Color: Yellow / Appearance: Clear / S.018 / pH: x  Gluc: x / Ketone: Small  / Bili: Negative / Urobili: Negative   Blood: x / Protein: Trace / Nitrite: Negative   Leuk Esterase: Negative / RBC: 5-10 /HPF / WBC 5-10 /HPF   Sq Epi: x / Non Sq Epi: x / Bacteria: x                              12.3   14.5  )-----------( 192      ( 19 Oct 2017 01:56 )             35.7                         12.5   26.3  )-----------( 251      ( 18 Oct 2017 03:51 )             35.0                         12.1   34.3  )-----------( 325      ( 17 Oct 2017 16:50 )             37.6

## 2017-10-19 NOTE — PROGRESS NOTE ADULT - ASSESSMENT
62 year old M w/ a PMH of type 1 DM, HTN, and HLD a/w DKA.     #Endo    DKA:  C/w patient specific insulin protocol. Endo consult appreciated.  Q1 FS and insulin adjustment.     Hypothyroidism: cw synthroid     #Neuro:    Headache:  Likely tension vs. migraine headache   - Avoid NSAIDs given LEONARD   - trial of tylenol IV 1g    #CVS:    HTN:  BP elevated, not on home meds  - restart home amlodipine 10mg  - hold enalapril 20mg given pt's LEONARD (baseline creat 1.39)  - gave labetalol 100mg x 1 now     Elevated troponin: Likely demand ischemia 2/2 DKA, troponin downtrending, no chest pain    #Resp:    Mild wheezing/ transmitted upper airway sounds:   - trial of duoneb    #GI:     NPO for DKA , will start diet once FS <200     #Renal:   LEONARD on CKD  Cr 1.39 in 7/2017.  Likely secondary to DKA related dehydration, creat downtrending   - c/w IVF   - trend creat    # ID:   SIRS:  Meets SIRS criteria by leukocytosis and fever, but leukocytosis downtrending, spiked low-grade temp 100.4F. Bcx 1/2 bottle positive for Coag neg Staph, likely contaminant. Ucx NGTD. CT A/P without acute pathology  - empiric zosyn D3  - will consider monitor off abx    # DVT ppx: heparin sq 62 year old M w/ a PMH of type 1 DM, HTN, and HLD a/w DKA.     #Endo    DKA:  FS improved, AG now closed to 12 on insulin gtt. s/p NPH 12U overnight.     - dosed lantus 40U now, will d/c insulin gtt in 2 hours if FS stable    - premeal humalog 7U TID, mod ISS    - f/u Endo recs      Hypothyroidism: cw synthroid     #Neuro:    Headache:  Likely tension vs. migraine headache   - Avoid NSAIDs given LEONARD   - trial of tylenol IV 1g    #CVS:    HTN:  BP elevated, not on home meds  - restart home amlodipine 10mg  - hold enalapril 20mg given pt's LEONARD (baseline creat 1.39)  - gave labetalol 100mg x 1 now     Elevated troponin: Likely demand ischemia 2/2 DKA, troponin downtrending, no chest pain    #Resp:    Mild wheezing/ transmitted upper airway sounds:   - trial of duoneb    #GI:     NPO for DKA , will start diet once FS <200     #Renal:   LEONARD on CKD  Cr 1.39 in 7/2017.  Likely secondary to DKA related dehydration, creat downtrending   - c/w IVF   - trend creat    # ID:   SIRS:  Meets SIRS criteria by leukocytosis and fever, but leukocytosis downtrending, spiked low-grade temp 100.4F. Bcx 1/2 bottle positive for Coag neg Staph, likely contaminant. Ucx NGTD. CT A/P without acute pathology  - empiric zosyn D3  - will consider monitor off abx    # DVT ppx: heparin sq

## 2017-10-19 NOTE — PROGRESS NOTE ADULT - PROBLEM SELECTOR PLAN 1
-uncontrolled with A1C 7.7  -patient was started on basal/bolus of lantus 30U and humalog 5TID. Had not received humalog with pt not tolerating PO during day yest. Had hyperglycemia with FS >350 overnight and AG 17 and insulin gtt restarted at 4unit/hr. Current AG clsoed again to 12.  -now s/p lantus 40U 10am and plan to overlap with insulin gtt 2hrs. Started on humalog 7TID.  -Monitor BMP and FS  -Plan to restart pump once wife brings supplies in  Plan to f/u with Endo in [place]. -uncontrolled with A1C 7.7  -patient was started on basal/bolus of lantus 30U and humalog 5TID. Had not received humalog with pt not tolerating PO during day yest. Had hyperglycemia with FS >350 overnight and AG 17 and insulin gtt restarted at 4unit/hr. Current AG clsoed again to 12.  -now s/p lantus 40U 10am and plan to overlap with insulin gtt 2hrs. Started on humalog 7TID.  -Monitor BMP and FS  -Plan to restart pump tomorrow at 8:30am if no further episodes of hyperglycemia or worsening acidosis.  Plan to f/u with Endo in [place].

## 2017-10-20 DIAGNOSIS — Z29.9 ENCOUNTER FOR PROPHYLACTIC MEASURES, UNSPECIFIED: ICD-10-CM

## 2017-10-20 DIAGNOSIS — E13.10 OTHER SPECIFIED DIABETES MELLITUS WITH KETOACIDOSIS WITHOUT COMA: ICD-10-CM

## 2017-10-20 DIAGNOSIS — Z46.81 ENCOUNTER FOR FITTING AND ADJUSTMENT OF INSULIN PUMP: ICD-10-CM

## 2017-10-20 DIAGNOSIS — R65.11 SYSTEMIC INFLAMMATORY RESPONSE SYNDROME (SIRS) OF NON-INFECTIOUS ORIGIN WITH ACUTE ORGAN DYSFUNCTION: ICD-10-CM

## 2017-10-20 DIAGNOSIS — I10 ESSENTIAL (PRIMARY) HYPERTENSION: ICD-10-CM

## 2017-10-20 DIAGNOSIS — E03.9 HYPOTHYROIDISM, UNSPECIFIED: ICD-10-CM

## 2017-10-20 DIAGNOSIS — E78.5 HYPERLIPIDEMIA, UNSPECIFIED: ICD-10-CM

## 2017-10-20 DIAGNOSIS — E11.9 TYPE 2 DIABETES MELLITUS WITHOUT COMPLICATIONS: ICD-10-CM

## 2017-10-20 LAB
ALBUMIN SERPL ELPH-MCNC: 3.2 G/DL — LOW (ref 3.3–5)
ALP SERPL-CCNC: 102 U/L — SIGNIFICANT CHANGE UP (ref 40–120)
ALT FLD-CCNC: 118 U/L RC — HIGH (ref 10–45)
AMPHET UR-MCNC: NEGATIVE — SIGNIFICANT CHANGE UP
ANION GAP SERPL CALC-SCNC: 12 MMOL/L — SIGNIFICANT CHANGE UP (ref 5–17)
AST SERPL-CCNC: 119 U/L — HIGH (ref 10–40)
BARBITURATES, URINE.: NEGATIVE — SIGNIFICANT CHANGE UP
BASOPHILS # BLD AUTO: 0 K/UL — SIGNIFICANT CHANGE UP (ref 0–0.2)
BASOPHILS NFR BLD AUTO: 0.3 % — SIGNIFICANT CHANGE UP (ref 0–2)
BENZODIAZ UR-MCNC: NEGATIVE — SIGNIFICANT CHANGE UP
BILIRUB SERPL-MCNC: 0.7 MG/DL — SIGNIFICANT CHANGE UP (ref 0.2–1.2)
BUN SERPL-MCNC: 31 MG/DL — HIGH (ref 7–23)
CALCIUM SERPL-MCNC: 8.9 MG/DL — SIGNIFICANT CHANGE UP (ref 8.4–10.5)
CHLORIDE SERPL-SCNC: 112 MMOL/L — HIGH (ref 96–108)
CO2 SERPL-SCNC: 23 MMOL/L — SIGNIFICANT CHANGE UP (ref 22–31)
COCAINE METAB.OTHER UR-MCNC: NEGATIVE — SIGNIFICANT CHANGE UP
CREAT SERPL-MCNC: 1.34 MG/DL — HIGH (ref 0.5–1.3)
CREATININE, URINE THERAPEUTIC: 89.8 MG/DL — SIGNIFICANT CHANGE UP
EOSINOPHIL # BLD AUTO: 0.2 K/UL — SIGNIFICANT CHANGE UP (ref 0–0.5)
EOSINOPHIL NFR BLD AUTO: 2.8 % — SIGNIFICANT CHANGE UP (ref 0–6)
GLUCOSE BLDC GLUCOMTR-MCNC: 127 MG/DL — HIGH (ref 70–99)
GLUCOSE BLDC GLUCOMTR-MCNC: 131 MG/DL — HIGH (ref 70–99)
GLUCOSE BLDC GLUCOMTR-MCNC: 215 MG/DL — HIGH (ref 70–99)
GLUCOSE BLDC GLUCOMTR-MCNC: 230 MG/DL — HIGH (ref 70–99)
GLUCOSE BLDC GLUCOMTR-MCNC: 351 MG/DL — HIGH (ref 70–99)
GLUCOSE SERPL-MCNC: 134 MG/DL — HIGH (ref 70–99)
HCT VFR BLD CALC: 34.9 % — LOW (ref 39–50)
HGB BLD-MCNC: 12.1 G/DL — LOW (ref 13–17)
LYMPHOCYTES # BLD AUTO: 1.5 K/UL — SIGNIFICANT CHANGE UP (ref 1–3.3)
LYMPHOCYTES # BLD AUTO: 17.8 % — SIGNIFICANT CHANGE UP (ref 13–44)
MAGNESIUM SERPL-MCNC: 1.8 MG/DL — SIGNIFICANT CHANGE UP (ref 1.6–2.6)
MCHC RBC-ENTMCNC: 33.2 PG — SIGNIFICANT CHANGE UP (ref 27–34)
MCHC RBC-ENTMCNC: 34.6 GM/DL — SIGNIFICANT CHANGE UP (ref 32–36)
MCV RBC AUTO: 96.2 FL — SIGNIFICANT CHANGE UP (ref 80–100)
METHADONE UR-MCNC: NEGATIVE — SIGNIFICANT CHANGE UP
METHAQUALONE UR QL: NEGATIVE — SIGNIFICANT CHANGE UP
METHAQUALONE UR-MCNC: NEGATIVE — SIGNIFICANT CHANGE UP
MONOCYTES # BLD AUTO: 0.7 K/UL — SIGNIFICANT CHANGE UP (ref 0–0.9)
MONOCYTES NFR BLD AUTO: 8.8 % — SIGNIFICANT CHANGE UP (ref 2–14)
NEUTROPHILS # BLD AUTO: 5.9 K/UL — SIGNIFICANT CHANGE UP (ref 1.8–7.4)
NEUTROPHILS NFR BLD AUTO: 70.2 % — SIGNIFICANT CHANGE UP (ref 43–77)
OPIATES UR-MCNC: NEGATIVE — SIGNIFICANT CHANGE UP
PCP UR-MCNC: NEGATIVE — SIGNIFICANT CHANGE UP
PHOSPHATE SERPL-MCNC: 2.2 MG/DL — LOW (ref 2.5–4.5)
PLATELET # BLD AUTO: 175 K/UL — SIGNIFICANT CHANGE UP (ref 150–400)
POTASSIUM SERPL-MCNC: 4.1 MMOL/L — SIGNIFICANT CHANGE UP (ref 3.5–5.3)
POTASSIUM SERPL-SCNC: 4.1 MMOL/L — SIGNIFICANT CHANGE UP (ref 3.5–5.3)
PROPOXYPH UR QL: NEGATIVE — SIGNIFICANT CHANGE UP
PROT SERPL-MCNC: 6.1 G/DL — SIGNIFICANT CHANGE UP (ref 6–8.3)
RBC # BLD: 3.63 M/UL — LOW (ref 4.2–5.8)
RBC # FLD: 11.9 % — SIGNIFICANT CHANGE UP (ref 10.3–14.5)
SODIUM SERPL-SCNC: 147 MMOL/L — HIGH (ref 135–145)
THC UR QL: NEGATIVE — SIGNIFICANT CHANGE UP
WBC # BLD: 8.5 K/UL — SIGNIFICANT CHANGE UP (ref 3.8–10.5)
WBC # FLD AUTO: 8.5 K/UL — SIGNIFICANT CHANGE UP (ref 3.8–10.5)

## 2017-10-20 PROCEDURE — 99223 1ST HOSP IP/OBS HIGH 75: CPT | Mod: AI

## 2017-10-20 PROCEDURE — 12345: CPT | Mod: NC

## 2017-10-20 PROCEDURE — 99233 SBSQ HOSP IP/OBS HIGH 50: CPT

## 2017-10-20 RX ORDER — PANTOPRAZOLE SODIUM 20 MG/1
40 TABLET, DELAYED RELEASE ORAL
Qty: 0 | Refills: 0 | Status: DISCONTINUED | OUTPATIENT
Start: 2017-10-20 | End: 2017-10-22

## 2017-10-20 RX ORDER — DEXTROSE 50 % IN WATER 50 %
12.5 SYRINGE (ML) INTRAVENOUS ONCE
Qty: 0 | Refills: 0 | Status: DISCONTINUED | OUTPATIENT
Start: 2017-10-20 | End: 2017-10-22

## 2017-10-20 RX ORDER — INSULIN LISPRO 100/ML
7 VIAL (ML) SUBCUTANEOUS
Qty: 0 | Refills: 0 | Status: DISCONTINUED | OUTPATIENT
Start: 2017-10-20 | End: 2017-10-20

## 2017-10-20 RX ORDER — SODIUM CHLORIDE 9 MG/ML
1000 INJECTION, SOLUTION INTRAVENOUS
Qty: 0 | Refills: 0 | Status: DISCONTINUED | OUTPATIENT
Start: 2017-10-20 | End: 2017-10-22

## 2017-10-20 RX ORDER — INSULIN LISPRO 100/ML
VIAL (ML) SUBCUTANEOUS
Qty: 0 | Refills: 0 | Status: DISCONTINUED | OUTPATIENT
Start: 2017-10-20 | End: 2017-10-20

## 2017-10-20 RX ORDER — CALCIUM CARBONATE 500(1250)
1 TABLET ORAL
Qty: 0 | Refills: 0 | Status: DISCONTINUED | OUTPATIENT
Start: 2017-10-20 | End: 2017-10-22

## 2017-10-20 RX ORDER — INSULIN GLARGINE 100 [IU]/ML
30 INJECTION, SOLUTION SUBCUTANEOUS ONCE
Qty: 0 | Refills: 0 | Status: DISCONTINUED | OUTPATIENT
Start: 2017-10-20 | End: 2017-10-20

## 2017-10-20 RX ORDER — INSULIN GLARGINE 100 [IU]/ML
46 INJECTION, SOLUTION SUBCUTANEOUS ONCE
Qty: 0 | Refills: 0 | Status: DISCONTINUED | OUTPATIENT
Start: 2017-10-20 | End: 2017-10-20

## 2017-10-20 RX ORDER — GLUCAGON INJECTION, SOLUTION 0.5 MG/.1ML
1 INJECTION, SOLUTION SUBCUTANEOUS ONCE
Qty: 0 | Refills: 0 | Status: DISCONTINUED | OUTPATIENT
Start: 2017-10-20 | End: 2017-10-22

## 2017-10-20 RX ORDER — SODIUM CHLORIDE 9 MG/ML
1000 INJECTION INTRAMUSCULAR; INTRAVENOUS; SUBCUTANEOUS
Qty: 0 | Refills: 0 | Status: DISCONTINUED | OUTPATIENT
Start: 2017-10-20 | End: 2017-10-21

## 2017-10-20 RX ORDER — DEXTROSE 50 % IN WATER 50 %
1 SYRINGE (ML) INTRAVENOUS ONCE
Qty: 0 | Refills: 0 | Status: DISCONTINUED | OUTPATIENT
Start: 2017-10-20 | End: 2017-10-22

## 2017-10-20 RX ORDER — DEXTROSE 50 % IN WATER 50 %
25 SYRINGE (ML) INTRAVENOUS ONCE
Qty: 0 | Refills: 0 | Status: DISCONTINUED | OUTPATIENT
Start: 2017-10-20 | End: 2017-10-22

## 2017-10-20 RX ORDER — PANTOPRAZOLE SODIUM 20 MG/1
40 TABLET, DELAYED RELEASE ORAL ONCE
Qty: 0 | Refills: 0 | Status: COMPLETED | OUTPATIENT
Start: 2017-10-20 | End: 2017-10-20

## 2017-10-20 RX ORDER — INSULIN LISPRO 100/ML
1 VIAL (ML) SUBCUTANEOUS
Qty: 0 | Refills: 0 | Status: DISCONTINUED | OUTPATIENT
Start: 2017-10-20 | End: 2017-10-22

## 2017-10-20 RX ORDER — SODIUM CHLORIDE 9 MG/ML
1000 INJECTION INTRAMUSCULAR; INTRAVENOUS; SUBCUTANEOUS ONCE
Qty: 0 | Refills: 0 | Status: DISCONTINUED | OUTPATIENT
Start: 2017-10-20 | End: 2017-10-21

## 2017-10-20 RX ORDER — INSULIN ASPART 100 [IU]/ML
1 INJECTION, SOLUTION SUBCUTANEOUS
Qty: 0 | Refills: 0 | Status: DISCONTINUED | OUTPATIENT
Start: 2017-10-20 | End: 2017-10-20

## 2017-10-20 RX ORDER — CALCIUM CARBONATE 500(1250)
1 TABLET ORAL
Qty: 0 | Refills: 0 | Status: DISCONTINUED | OUTPATIENT
Start: 2017-10-20 | End: 2017-10-20

## 2017-10-20 RX ADMIN — HEPARIN SODIUM 5000 UNIT(S): 5000 INJECTION INTRAVENOUS; SUBCUTANEOUS at 13:05

## 2017-10-20 RX ADMIN — ATORVASTATIN CALCIUM 20 MILLIGRAM(S): 80 TABLET, FILM COATED ORAL at 20:20

## 2017-10-20 RX ADMIN — Medication 1 TABLET(S): at 16:43

## 2017-10-20 RX ADMIN — AMLODIPINE BESYLATE 10 MILLIGRAM(S): 2.5 TABLET ORAL at 05:08

## 2017-10-20 RX ADMIN — PANTOPRAZOLE SODIUM 40 MILLIGRAM(S): 20 TABLET, DELAYED RELEASE ORAL at 17:02

## 2017-10-20 RX ADMIN — Medication 100 MILLIGRAM(S): at 16:42

## 2017-10-20 RX ADMIN — Medication 81 MILLIGRAM(S): at 12:25

## 2017-10-20 RX ADMIN — Medication 100 MILLIGRAM(S): at 23:09

## 2017-10-20 RX ADMIN — Medication 100 MILLIGRAM(S): at 10:05

## 2017-10-20 RX ADMIN — Medication 100 MILLIGRAM(S): at 00:30

## 2017-10-20 RX ADMIN — Medication 88 MICROGRAM(S): at 05:08

## 2017-10-20 RX ADMIN — Medication 1 TABLET(S): at 12:26

## 2017-10-20 RX ADMIN — HEPARIN SODIUM 5000 UNIT(S): 5000 INJECTION INTRAVENOUS; SUBCUTANEOUS at 05:09

## 2017-10-20 RX ADMIN — HEPARIN SODIUM 5000 UNIT(S): 5000 INJECTION INTRAVENOUS; SUBCUTANEOUS at 20:20

## 2017-10-20 RX ADMIN — SODIUM CHLORIDE 115 MILLILITER(S): 9 INJECTION INTRAMUSCULAR; INTRAVENOUS; SUBCUTANEOUS at 10:33

## 2017-10-20 NOTE — PROGRESS NOTE ADULT - PROBLEM SELECTOR PLAN 5
Likely 2/2 profound dehydration from DKA, now improving with downtrending creatinine, however patient still volume depleted given very dry mucous membranes and suboptimal PO intake  - NS bolus x 1 with maintenance IVF hydration as above  - encourage PO intake  - monitor Cr Continue amlodipine and labetalol  - hold enalapril 20mg po daily for now given patient at risk for worsening LEONARD given ongoing dehydration. May d/c labetalol and reinstate home enalapril once patient PO intake and hydration status improves.

## 2017-10-20 NOTE — PROGRESS NOTE ADULT - ASSESSMENT
61 y/o M w/ uncontrolled Type 1 DM on omnipod insulin pump a/w DKA  Pump resumed today (high risk patient with high level decision-making).

## 2017-10-20 NOTE — ADVANCED PRACTICE NURSE CONSULT - ASSESSMENT
Pt is a 62 year old M w/ a PMH of T1DM (TONE) for 32 years w/ retinopathy (A1C 7.7), HTN, and HLD that presented with hyperglycemia.   Pt found to be severely acidotic with hyperglycemia and also with elevated lactate and WBC with no clear source of infection.  Admitted to MICU for DKA.  Pt reports he was scheduled for Jury Duty in [place] (former residence)on Monday but that he now lives in [place].  He had eaten heavy the night before and then ended up in court in [place] on Monday with no PDM and was unable to administer his correction/meal boluses.  Pt folllwed by Dr. Sandi Phelps (Endocrine in [place]).  Pt has insulin pump supplies and PDM with him today and was supposed to restart insulin pump today 8:30am if no further episodes of hyperglycemia or worsening acidosis as per Endocrine team (Dr. Cruz).  However, Dr. Chad Barney wrote orders for Lantus 46 units, Humalog 7 units before meals, and a Humalog correction scale in addition to the insulin pump orders.  As per, Mina (Primary RN) no Lantus Humalog correction and Humalog pre-meal injection doses were administered and pt still has not reconnected insulin pump at 9:40am when RN, RADHA visited him.  Mina (RN) advised to contact Dr. Savita IRAHETA and have the insulin SC orders D/C’d.   Pt DOES HAVE insulin pump supplies with him and only needs Humalog insulin vial.  Pt has 1 POD and states his landlord will be bringing him more PODS today.  In addition, there are NO insulin pump forms in chart.  Mina (Primary RN) made aware and to have pt complete ALL forms and obtain prescriber signature.   Pt will be changing his POD today and due to change site Monday.  Pt also wears a Dexom G5 sensor which landlord will be bringing for him as well.   Pt’s current insulin pump settings as follows:  Basal  12am – 1.65 units/hour  6am – 1.6 units/hour  10am – 1.45 units/hour  3pm – 1.6 units/hour  6pm – 1.65 units/hour  Total daily basal dose: 38.25 units   ICR  12am-12am -1:10   ISF  12am-12am – 25   Active insulin time: 4 hours  BGT   100 {150}

## 2017-10-20 NOTE — PROGRESS NOTE ADULT - PROBLEM SELECTOR PLAN 4
Continue amlodipine  - hold enalapril 20mg po daily for now given patient at risk for worsening LEONARD given ongoing dehydration. May reinstate once patient PO intake and hydration status improves. Continue amlodipine and labetalol  - hold enalapril 20mg po daily for now given patient at risk for worsening LEONARD given ongoing dehydration. May d/c labetalol and reinstate home enalapril once patient PO intake and hydration status improves. 2/2 DKA, maintained by poor PO intake due to post-emetic odynophagia  - NS bolus 1L  x 1 stat, continue IVF at 115cc/hr until PO intake improves  - maalox prn, daily ppi, encourage PO intake

## 2017-10-20 NOTE — PROGRESS NOTE ADULT - PROBLEM SELECTOR PLAN 7
continue synthroid 88mcg po daily Likely 2/2 DKA and stress reaction vs. less likely infection- now resolved.  - continue monitoring, no indiciation for antibiotics

## 2017-10-20 NOTE — PROGRESS NOTE ADULT - PROBLEM SELECTOR PLAN 2
Pump placed today.  Temp basal 120% given hyperglycemia and higher insulin requirements post ICU  Next site change 10/23.  Pt. has supplies with him

## 2017-10-20 NOTE — PROGRESS NOTE ADULT - PROBLEM SELECTOR PLAN 2
See problem #1  - lantus 46 units right now, continue 7 units premeal, MDSS with fingerstick monitoring  - restart insulin pump when all supplies gathered, appreciate endocrine support  - diabetes education Present on admission, likely 2/2 to DKA, now resolved. Received empiric antibiotics in micu but no overt infectious source identified or currently suspected.   - continue monitoring.

## 2017-10-20 NOTE — PROGRESS NOTE ADULT - PROBLEM SELECTOR PLAN 1
Resolved with no more anion gap, sugars in 200's, pt without nausea/vomiting. Received 40 units lantus yesterday 10/19 and 7 units TIDWM.  - plan was for insulin pump this morning, however pt does not have all the supplies. In discussion with endocrine Dr. Cruz, will give additional 46 units lantus STAT, and continue premeal insulin. Endocrine to assist in gathering pump supplies and will plan to start pump either overnight or tomorrow morning prior to discharge.   - continue monitoring fingersticks  - monitor anion gap and electrolytes daily, sooner if patient has uptrending fingersticks or develops recurrent abdominal pain, nausea or vomiting.

## 2017-10-20 NOTE — PROGRESS NOTE ADULT - PROBLEM SELECTOR PLAN 8
- ppx: hsq  - diet: DASH/TLC, consistent carg  - dispo: medical floor - ppx: hsq  - diet: DASH/TLC, consistent carbs  - dispo: medical floor continue synthroid 88mcg po daily

## 2017-10-20 NOTE — CHART NOTE - NSCHARTNOTEFT_GEN_A_CORE
MAR Medicine Accept Note    This is a 62M PMH of type 1 DM (on insulin pump), HTN, and HLD that presents with hyperglycemia in setting of misplacing insulin pump controller, found to have DKA.  Patient was admitted to MICU for further management of DKA w/ severe acidosis c/b leukocytosis and LEONARD. Admitting blood work notable for an anion gap of 41, serum bicarbonate of 5, Cr 3.79 pH on VBG was 6.99, large serum acetone, WBC 34 and lactate 6.3.  Patient was given aggressive IVF resuscitation and started on insulin gtt, Endocrinology was consulted for further management.  Patient was initially transitioned to Lantus 30U  and Humalog 6/6/6 on 10/18/17, but his AG increased to 17 and he had persistent hyperglycemia, so insulin gtt was restarted. He was then transitioned to Lantus 40U and Humalog 7/7/7 on 10/19 with improved FS. Patient AG remained within normal limits at 12, and his LEONARD improved, Cr now 1.34. No infectious etiology was identified for leukocytosis of 34, which improved to 8.5, and Zosyn was stopped after three days. Patient also noted to have elevated troponins, likely demand ischemia. Patient stable for transfer to medicine.     For Follow-up  - Plan to restart home insulin pump 10/20 at 8AM if FS remain stable - please DISCONTINUE SSI once pump is started  - Restart home enalapril 20mg if Cr remains stable [facility] Medicine Accept Note    This is a 62M PMH of type 1 DM (on insulin pump), HTN, and HLD that presents with hyperglycemia in setting of misplacing insulin pump controller, found to have DKA.  Patient was admitted to MICU for further management of DKA w/ severe acidosis c/b leukocytosis and LEONARD. Admitting blood work notable for an anion gap of 41, serum bicarbonate of 5, Cr 3.79 pH on VBG was 6.99, large serum acetone, WBC 34 and lactate 6.3.  Patient was given aggressive IVF resuscitation and started on insulin gtt, Endocrinology was consulted for further management.  Patient was initially transitioned to Lantus 30U  and Humalog 6/6/6 on 10/18/17, but his AG increased to 17 and he had persistent hyperglycemia, so insulin gtt was restarted. He was then transitioned to Lantus 40U and Humalog 7/7/7 on 10/19 with improved FS. Patient AG remained within normal limits at 12, and his LEONARD improved, Cr now 1.34. No infectious etiology was identified for leukocytosis of 34, which improved to 8.5, and Zosyn was stopped after three days. Patient also noted to have elevated troponins, likely demand ischemia. Patient stable for transfer to medicine.     For Follow-up  - Plan to restart home insulin pump 10/20 at 8AM if FS remain stable - please DISCONTINUE SSI once pump is started  - Restart home enalapril 20mg if Cr remains stable  - F/u Endo recs  - Monitor BMP for Cr and AG     Beulah Durbin, PGY3  MAR spectra #[id number] MAR Medicine Accept Note    This is a 62M PMH of type 1 DM (on insulin pump), HTN, and HLD that presents with hyperglycemia in setting of misplacing insulin pump controller, found to have DKA.  Patient was admitted to MICU for further management of DKA w/ severe acidosis c/b leukocytosis and LEONARD. Admitting blood work notable for an anion gap of 41, serum bicarbonate of 5, Cr 3.79 pH on VBG was 6.99, large serum acetone, WBC 34 and lactate 6.3.  Patient was given aggressive IVF resuscitation and started on insulin gtt, Endocrinology was consulted for further management.  Patient was initially transitioned to Lantus 30U  and Humalog 6/6/6 on 10/18/17, but his AG increased to 17 and he had persistent hyperglycemia, so insulin gtt was restarted. He was then transitioned to Lantus 40U and Humalog 7/7/7 on 10/19 with improved FS. Patient AG remained within normal limits at 12, and his LEONARD improved, Cr now 1.34. No infectious etiology was identified for leukocytosis of 34, which improved to 8.5, and Zosyn was stopped after three days. Patient also noted to have elevated troponins, likely demand ischemia. Patient stable for transfer to medicine.     For Follow-up  - Plan to restart home insulin pump 10/20 at 8AM if FS remain stable - please DISCONTINUE SSI once pump is started  - Restart home enalapril 20mg if Cr remains stable  - F/u Endo recs  - Monitor BMP for Cr and AG     Discussed with 8MonKarina pittman NP @ 6:40 AM    Beulah Durbin, PGY3  MAR spectra #[id number]

## 2017-10-20 NOTE — PROGRESS NOTE ADULT - ASSESSMENT
62m hx as above admitted to MICU for management of DKA, now resolved and transferred for medicine for further management

## 2017-10-20 NOTE — PROGRESS NOTE ADULT - PROBLEM SELECTOR PLAN 1
DKA resolved  Placed back on pump this morning.  Given persistent hyperglycemia can increase temp basal 120% for now.  If persistent over the next 24 hours may need to adjust settings in pump, but pt. with hx of afternoon hypoglycemia at home and A1c close to goal.   Goal glucose 100-180

## 2017-10-20 NOTE — PROGRESS NOTE ADULT - SUBJECTIVE AND OBJECTIVE BOX
Chief Complaint: Evaluating this 61 y/o M for uncontrolled Type 1 DM w/ hyperglycemia on insulin pump a/w DKA.       Interval History: Restarted insulin pump this morning. Still with some hyperglycemia. Eating more today. Denies chest pain or shortness of breath. Denies nausea or vomiting. +sore throat.     MEDICATIONS  (STANDING):  amLODIPine   Tablet 10 milliGRAM(s) Oral daily  aspirin enteric coated 81 milliGRAM(s) Oral daily  atorvastatin 20 milliGRAM(s) Oral at bedtime  calcium carbonate 500 mG (Tums) Chewable 1 Tablet(s) Chew four times a day  dextrose 5%. 1000 milliLiter(s) (50 mL/Hr) IV Continuous <Continuous>  dextrose 50% Injectable 12.5 Gram(s) IV Push once  dextrose 50% Injectable 25 Gram(s) IV Push once  dextrose 50% Injectable 25 Gram(s) IV Push once  dextrose 50% Injectable 12.5 Gram(s) IV Push once  dextrose 50% Injectable 25 Gram(s) IV Push once  dextrose 50% Injectable 25 Gram(s) IV Push once  heparin  Injectable 5000 Unit(s) SubCutaneous every 8 hours  insulin lispro (HumaLOG) Pump 1 Each SubCutaneous Continuous Pump  labetalol 100 milliGRAM(s) Oral three times a day  levothyroxine 88 MICROGram(s) Oral daily  sodium chloride 0.9% Bolus 1000 milliLiter(s) IV Bolus once  sodium chloride 0.9%. 1000 milliLiter(s) (115 mL/Hr) IV Continuous <Continuous>    MEDICATIONS  (PRN):  benzocaine 15 mG/menthol 3.6 mG Lozenge 1 Lozenge Oral four times a day PRN Sore Throat  dextrose Gel 1 Dose(s) Oral once PRN Blood Glucose LESS THAN 70 milliGRAM(s)/deciliter  dextrose Gel 1 Dose(s) Oral once PRN Blood Glucose LESS THAN 70 milliGRAM(s)/deciLiter  glucagon  Injectable 1 milliGRAM(s) IntraMuscular once PRN Glucose LESS THAN 70 milligrams/deciliter  glucagon  Injectable 1 milliGRAM(s) IntraMuscular once PRN Glucose <70 milliGRAM(s)/deciLiter      Allergies    No Known Allergies    Intolerances      Review of Systems:  Constitutional: No fever  Eyes: No blurry vision  Cardiovascular: No chest pain  Respiratory: No SOB  GI: No abdominal pain, No nausea, No vomiting  Endocrine: as noted in HPI    All other negative      PHYSICAL EXAM:  VITALS: T(C): 37.2 (10-20-17 @ 15:27)  T(F): 99 (10-20-17 @ 15:27), Max: 99.6 (10-20-17 @ 04:00)  HR: 83 (10-20-17 @ 15:27) (75 - 90)  BP: 144/79 (10-20-17 @ 15:27) (131/61 - 162/82)  RR:  (12 - 26)  SpO2:  (90% - 96%)  Wt(kg): --  GENERAL: NAD at this time  EYES: EOMI, No proptosis  HEENT:  Atraumatic, Normocephalic,   RESPIRATORY: Clear to auscultation bilaterally, full excursion, non labored  CARDIOVASCULAR: Regular rhythm; normal S1/S2, no peripheral edema  GI: +pod on right side of abdomen, Soft, nontender, non distended, normal bowel sounds  SKIN: Warm and dry  PSYCH: normal affect, normal mood      POCT Blood Glucose.: 351 mg/dL (10-20-17 @ 12:15)  POCT Blood Glucose.: 230 mg/dL (10-20-17 @ 08:23)  POCT Blood Glucose.: 131 mg/dL (10-20-17 @ 01:04)  POCT Blood Glucose.: 117 mg/dL (10-19-17 @ 22:41)  POCT Blood Glucose.: 86 mg/dL (10-19-17 @ 21:51)  POCT Blood Glucose.: 115 mg/dL (10-19-17 @ 18:13)  POCT Blood Glucose.: 143 mg/dL (10-19-17 @ 16:29)  POCT Blood Glucose.: 161 mg/dL (10-19-17 @ 15:03)  POCT Blood Glucose.: 183 mg/dL (10-19-17 @ 14:09)  POCT Blood Glucose.: 165 mg/dL (10-19-17 @ 13:09)  POCT Blood Glucose.: 185 mg/dL (10-19-17 @ 12:11)  POCT Blood Glucose.: 204 mg/dL (10-19-17 @ 10:56)  POCT Blood Glucose.: 220 mg/dL (10-19-17 @ 10:06)  POCT Blood Glucose.: 214 mg/dL (10-19-17 @ 09:00)  POCT Blood Glucose.: 229 mg/dL (10-19-17 @ 08:15)  POCT Blood Glucose.: 263 mg/dL (10-19-17 @ 06:43)  POCT Blood Glucose.: 288 mg/dL (10-19-17 @ 05:49)  POCT Blood Glucose.: 248 mg/dL (10-19-17 @ 04:58)  POCT Blood Glucose.: 346 mg/dL (10-19-17 @ 03:25)  POCT Blood Glucose.: 367 mg/dL (10-19-17 @ 01:11)  POCT Blood Glucose.: 319 mg/dL (10-18-17 @ 21:16)  POCT Blood Glucose.: 243 mg/dL (10-18-17 @ 17:55)  POCT Blood Glucose.: 186 mg/dL (10-18-17 @ 15:40)  POCT Blood Glucose.: 167 mg/dL (10-18-17 @ 14:30)  POCT Blood Glucose.: 203 mg/dL (10-18-17 @ 14:09)  POCT Blood Glucose.: 183 mg/dL (10-18-17 @ 13:19)  POCT Blood Glucose.: 175 mg/dL (10-18-17 @ 12:12)  POCT Blood Glucose.: 194 mg/dL (10-18-17 @ 11:03)  POCT Blood Glucose.: 203 mg/dL (10-18-17 @ 10:00)  POCT Blood Glucose.: 225 mg/dL (10-18-17 @ 09:08)  POCT Blood Glucose.: 257 mg/dL (10-18-17 @ 08:01)  POCT Blood Glucose.: 272 mg/dL (10-18-17 @ 06:50)  POCT Blood Glucose.: 266 mg/dL (10-18-17 @ 06:04)  POCT Blood Glucose.: 345 mg/dL (10-18-17 @ 04:57)  POCT Blood Glucose.: 183 mg/dL (10-18-17 @ 04:55)  POCT Blood Glucose.: 384 mg/dL (10-18-17 @ 04:02)  POCT Blood Glucose.: 415 mg/dL (10-18-17 @ 03:06)  POCT Blood Glucose.: 458 mg/dL (10-18-17 @ 01:56)  POCT Blood Glucose.: 494 mg/dL (10-18-17 @ 01:12)  POCT Blood Glucose.: 592 mg/dL (10-17-17 @ 23:59)  POCT Blood Glucose.: >600 mg/dL (10-17-17 @ 23:10)  POCT Blood Glucose.: >600 mg/dL (10-17-17 @ 22:09)  POCT Blood Glucose.: >600 mg/dL (10-17-17 @ 21:14)  POCT Blood Glucose.: >600 mg/dL (10-17-17 @ 20:06)  POCT Blood Glucose.: >600 mg/dL (10-17-17 @ 16:34)        10-20    147<H>  |  112<H>  |  31<H>  ----------------------------<  134<H>  4.1   |  23  |  1.34<H>    EGFR if : 64  EGFR if non : 56<L>    Ca    8.9      10-20  Mg     1.8     10-20  Phos  2.2     10-20    TPro  6.1  /  Alb  3.2<L>  /  TBili  0.7  /  DBili  x   /  AST  119<H>  /  ALT  118<H>  /  AlkPhos  102  10-20        Thyroid Function Tests:  10-17 @ 12:42 TSH 2.43 FreeT4 -- T3 -- Anti TPO -- Anti Thyroglobulin Ab -- TSI --      Hemoglobin A1C, Whole Blood: 7.7 % <H> [4.0 - 5.6] (10-17-17 @ 20:23)

## 2017-10-20 NOTE — ADVANCED PRACTICE NURSE CONSULT - RECOMMEDATIONS
Case discussed with Primary RN.  Pt to reconnect insulin pump as soon as possible and Primary RN to get BG FS and report back if critical value (i.e. 250 mg/dl or greater).  Pt made aware that he should only use hospital insulin and glucometer while in hospital. DSME provided regarding s/s, prevention and appropriate treatment for hypo-hyperglycemia with verbalized understanding obtained via the teach-back method.

## 2017-10-20 NOTE — PROGRESS NOTE ADULT - PROBLEM SELECTOR PLAN 3
2/2 DKA, maintained by poor PO intake due to post-emetic odynophagia  - NS bolus 1L  x 1 stat, continue IVF at 115cc/hr until PO intake improves  - maalox prn, daily ppi, encourage PO intake See problem #1  - lantus 46 units right now, continue 7 units premeal, MDSS with fingerstick monitoring  - restart insulin pump when all supplies gathered, appreciate endocrine support  - diabetes education

## 2017-10-20 NOTE — PROGRESS NOTE ADULT - SUBJECTIVE AND OBJECTIVE BOX
Internal Medicine Attending Accept Note    Hospital course: This is a 62M PMH of type 1 DM (on insulin pump), HTN, and HLD that presented with hyperglycemia in setting of misplacing insulin pump controller, found to have DKA.  Patient was admitted to MICU for further management of DKA w/ severe acidosis c/b leukocytosis and LEONARD. Admitting blood work notable for an anion gap of 41, serum bicarbonate of 5, Cr 3.79 pH on VBG was 6.99, large serum acetone, WBC 34 and lactate 6.3.  Patient was given aggressive IVF resuscitation and started on insulin gtt, Endocrinology was consulted for further management.  Patient was initially transitioned to Lantus 30U  and Humalog 6/6/6 on 10/18/17, but his AG increased to 17 and he had persistent hyperglycemia, so insulin gtt was restarted. He was then transitioned to Lantus 40U and Humalog 7/7/7 on 10/19 with improved FS. Patient AG remained within normal limits at 12, and his LEONARD improved, Cr now 1.34. No infectious etiology was identified for leukocytosis of 34, which improved to 8.5, and Zosyn was stopped after three days. Patient also noted to have elevated troponins, likely demand ischemia. Patient transferred to medicine for further management and plan to restart insulin pump.    Overnight, no acute events. Received his last dose of insulin last night with dinner- 7 units. This morning pt reports he does not have all the supplies he needs to restart and implant in insulin pump so pump has not been restarted. +sore throat from vomiting during DKA and resultant odynophagia. No dysphagia. No more nausea or vomiting. No other acute reported symptoms- no fevers, chills, cough, sputum, chest pain, rashes, dysuria, polyuria.    PMH: DM1, htn, hypothyroidism  PSH: no significant surgical history  FH: no pertinent family history reported    Social: Nonsmoker, occasional drinker, no drugs, lives at home independently performs ADLs    VITAL SIGNS:  Vital Signs Last 24 Hrs  T(C): 36.8 (20 Oct 2017 05:03), Max: 37.6 (19 Oct 2017 12:00)  T(F): 98.3 (20 Oct 2017 05:03), Max: 99.7 (19 Oct 2017 16:00)  HR: 84 (20 Oct 2017 05:03) (75 - 92)  BP: 162/82 (20 Oct 2017 05:03) (131/61 - 162/82)  BP(mean): 89 (20 Oct 2017 04:00) (88 - 112)  RR: 18 (20 Oct 2017 05:03) (12 - 26)  SpO2: 94% (20 Oct 2017 05:03) (90% - 97%)    PHYSICAL EXAM:     GENERAL: no acute distress  HEENT: PERRLA, EOMI, dry mucous membranes  RESPIRATORY: CTAB, no w/c   CARDIOVASCULAR: RRR, no murmurs, gallops, rubs  ABDOMINAL: soft, non-tender, non-distended, positive bowel sounds   EXTREMITIES: no clubbing, cyanosis, or edema  NEUROLOGICAL: alert and oriented x 3, non-focal motor and sensory exams  SKIN: no rashes or lesions   MUSCULOSKELETAL: no gross joint deformity                          12.1   8.5   )-----------( 175      ( 20 Oct 2017 02:25 )             34.9     10-20    147<H>  |  112<H>  |  31<H>  ----------------------------<  134<H>  4.1   |  23  |  1.34<H>    Ca    8.9      20 Oct 2017 02:26  Phos  2.2     10-20  Mg     1.8     10-20    TPro  6.1  /  Alb  3.2<L>  /  TBili  0.7  /  DBili  x   /  AST  119<H>  /  ALT  118<H>  /  AlkPhos  102  10-20      CAPILLARY BLOOD GLUCOSE  230 (20 Oct 2017 08:23)  131 (20 Oct 2017 01:00)  117 (19 Oct 2017 23:00)  86 (19 Oct 2017 22:00)  115 (19 Oct 2017 18:00)  143 (19 Oct 2017 16:00)  161 (19 Oct 2017 15:00)  183 (19 Oct 2017 14:00)  165 (19 Oct 2017 13:00)  185 (19 Oct 2017 12:00)  204 (19 Oct 2017 11:00)  220 (19 Oct 2017 10:00)      POCT Blood Glucose.: 230 mg/dL (20 Oct 2017 08:23)  POCT Blood Glucose.: 131 mg/dL (20 Oct 2017 01:04)  POCT Blood Glucose.: 117 mg/dL (19 Oct 2017 22:41)  POCT Blood Glucose.: 86 mg/dL (19 Oct 2017 21:51)  POCT Blood Glucose.: 115 mg/dL (19 Oct 2017 18:13)  POCT Blood Glucose.: 143 mg/dL (19 Oct 2017 16:29)  POCT Blood Glucose.: 161 mg/dL (19 Oct 2017 15:03)  POCT Blood Glucose.: 183 mg/dL (19 Oct 2017 14:09)  POCT Blood Glucose.: 165 mg/dL (19 Oct 2017 13:09)  POCT Blood Glucose.: 185 mg/dL (19 Oct 2017 12:11)  POCT Blood Glucose.: 204 mg/dL (19 Oct 2017 10:56)  POCT Blood Glucose.: 220 mg/dL (19 Oct 2017 10:06)      MEDICATIONS  (STANDING):  amLODIPine   Tablet 10 milliGRAM(s) Oral daily  aspirin enteric coated 81 milliGRAM(s) Oral daily  atorvastatin 20 milliGRAM(s) Oral at bedtime  dextrose 5%. 1000 milliLiter(s) (50 mL/Hr) IV Continuous <Continuous>  dextrose 50% Injectable 12.5 Gram(s) IV Push once  dextrose 50% Injectable 25 Gram(s) IV Push once  dextrose 50% Injectable 25 Gram(s) IV Push once  dextrose 50% Injectable 12.5 Gram(s) IV Push once  dextrose 50% Injectable 25 Gram(s) IV Push once  dextrose 50% Injectable 25 Gram(s) IV Push once  heparin  Injectable 5000 Unit(s) SubCutaneous every 8 hours  insulin aspart (NovoLOG) Pump 1 Each SubCutaneous Continuous Pump  insulin glargine Injectable (LANTUS) 30 Unit(s) SubCutaneous once  insulin lispro (HumaLOG) corrective regimen sliding scale   SubCutaneous Before meals and at bedtime  insulin lispro Injectable (HumaLOG) 7 Unit(s) SubCutaneous three times a day before meals  labetalol 100 milliGRAM(s) Oral three times a day  levothyroxine 88 MICROGram(s) Oral daily    MEDICATIONS  (PRN):  benzocaine 15 mG/menthol 3.6 mG Lozenge 1 Lozenge Oral four times a day PRN Sore Throat  dextrose Gel 1 Dose(s) Oral once PRN Blood Glucose LESS THAN 70 milliGRAM(s)/deciliter  dextrose Gel 1 Dose(s) Oral once PRN Blood Glucose LESS THAN 70 milliGRAM(s)/deciLiter  glucagon  Injectable 1 milliGRAM(s) IntraMuscular once PRN Glucose LESS THAN 70 milligrams/deciliter  glucagon  Injectable 1 milliGRAM(s) IntraMuscular once PRN Glucose <70 milliGRAM(s)/deciLiter Internal Medicine Attending Accept Note    Hospital course: This is a 62M PMH of type 1 DM (on insulin pump), HTN, and HLD that presented with hyperglycemia in setting of misplacing insulin pump controller, found to have DKA.  Patient was admitted to MICU for further management of DKA w/ severe acidosis c/b leukocytosis and LEONARD. Admitting blood work notable for an anion gap of 41, serum bicarbonate of 5, Cr 3.79 pH on VBG was 6.99, large serum acetone, WBC 34 and lactate 6.3.  Patient was given aggressive IVF resuscitation and started on insulin gtt, Endocrinology was consulted for further management.  Patient was initially transitioned to Lantus 30U  and Humalog 6/6/6 on 10/18/17, but his AG increased to 17 and he had persistent hyperglycemia, so insulin gtt was restarted. He was then transitioned to Lantus 40U and Humalog 7/7/7 on 10/19 with improved FS. Patient AG remained within normal limits at 12, and his LEONARD improved, Cr now 1.34. No infectious etiology was identified for leukocytosis of 34, which improved to 8.5, and Zosyn was stopped after three days. Patient also noted to have elevated troponins, likely demand ischemia. Patient transferred to medicine for further management and plan to restart insulin pump.    Overnight, no acute events. Received his last dose of insulin last night with dinner- 7 units. This morning pt reports he does not have all the supplies he needs to restart and implant in insulin pump so pump has not been restarted. +sore throat from vomiting during DKA and resultant odynophagia. No dysphagia. No more nausea or vomiting. No other acute reported symptoms- no fevers, chills, cough, sputum, chest pain, rashes, dysuria, polyuria.    ROS: 14 point ros otherwise negative    PMH: DM1, htn, hypothyroidism  PSH: no significant surgical history  FH: no pertinent family history reported    Social: Nonsmoker, occasional drinker, no drugs, lives at home independently performs ADLs    VITAL SIGNS:  Vital Signs Last 24 Hrs  T(C): 36.8 (20 Oct 2017 05:03), Max: 37.6 (19 Oct 2017 12:00)  T(F): 98.3 (20 Oct 2017 05:03), Max: 99.7 (19 Oct 2017 16:00)  HR: 84 (20 Oct 2017 05:03) (75 - 92)  BP: 162/82 (20 Oct 2017 05:03) (131/61 - 162/82)  BP(mean): 89 (20 Oct 2017 04:00) (88 - 112)  RR: 18 (20 Oct 2017 05:03) (12 - 26)  SpO2: 94% (20 Oct 2017 05:03) (90% - 97%)    PHYSICAL EXAM:     GENERAL: no acute distress  HEENT: PERRLA, EOMI, dry mucous membranes  RESPIRATORY: CTAB, no w/c   CARDIOVASCULAR: RRR, no murmurs, gallops, rubs  ABDOMINAL: soft, non-tender, non-distended, positive bowel sounds   EXTREMITIES: no clubbing, cyanosis, or edema  NEUROLOGICAL: alert and oriented x 3, non-focal motor and sensory exams  SKIN: no rashes or lesions   MUSCULOSKELETAL: no gross joint deformity                          12.1   8.5   )-----------( 175      ( 20 Oct 2017 02:25 )             34.9     10-20    147<H>  |  112<H>  |  31<H>  ----------------------------<  134<H>  4.1   |  23  |  1.34<H>    Ca    8.9      20 Oct 2017 02:26  Phos  2.2     10-20  Mg     1.8     10-20    TPro  6.1  /  Alb  3.2<L>  /  TBili  0.7  /  DBili  x   /  AST  119<H>  /  ALT  118<H>  /  AlkPhos  102  10-20      CAPILLARY BLOOD GLUCOSE  230 (20 Oct 2017 08:23)  131 (20 Oct 2017 01:00)  117 (19 Oct 2017 23:00)  86 (19 Oct 2017 22:00)  115 (19 Oct 2017 18:00)  143 (19 Oct 2017 16:00)  161 (19 Oct 2017 15:00)  183 (19 Oct 2017 14:00)  165 (19 Oct 2017 13:00)  185 (19 Oct 2017 12:00)  204 (19 Oct 2017 11:00)  220 (19 Oct 2017 10:00)      POCT Blood Glucose.: 230 mg/dL (20 Oct 2017 08:23)  POCT Blood Glucose.: 131 mg/dL (20 Oct 2017 01:04)  POCT Blood Glucose.: 117 mg/dL (19 Oct 2017 22:41)  POCT Blood Glucose.: 86 mg/dL (19 Oct 2017 21:51)  POCT Blood Glucose.: 115 mg/dL (19 Oct 2017 18:13)  POCT Blood Glucose.: 143 mg/dL (19 Oct 2017 16:29)  POCT Blood Glucose.: 161 mg/dL (19 Oct 2017 15:03)  POCT Blood Glucose.: 183 mg/dL (19 Oct 2017 14:09)  POCT Blood Glucose.: 165 mg/dL (19 Oct 2017 13:09)  POCT Blood Glucose.: 185 mg/dL (19 Oct 2017 12:11)  POCT Blood Glucose.: 204 mg/dL (19 Oct 2017 10:56)  POCT Blood Glucose.: 220 mg/dL (19 Oct 2017 10:06)      MEDICATIONS  (STANDING):  amLODIPine   Tablet 10 milliGRAM(s) Oral daily  aspirin enteric coated 81 milliGRAM(s) Oral daily  atorvastatin 20 milliGRAM(s) Oral at bedtime  dextrose 5%. 1000 milliLiter(s) (50 mL/Hr) IV Continuous <Continuous>  dextrose 50% Injectable 12.5 Gram(s) IV Push once  dextrose 50% Injectable 25 Gram(s) IV Push once  dextrose 50% Injectable 25 Gram(s) IV Push once  dextrose 50% Injectable 12.5 Gram(s) IV Push once  dextrose 50% Injectable 25 Gram(s) IV Push once  dextrose 50% Injectable 25 Gram(s) IV Push once  heparin  Injectable 5000 Unit(s) SubCutaneous every 8 hours  insulin aspart (NovoLOG) Pump 1 Each SubCutaneous Continuous Pump  insulin glargine Injectable (LANTUS) 30 Unit(s) SubCutaneous once  insulin lispro (HumaLOG) corrective regimen sliding scale   SubCutaneous Before meals and at bedtime  insulin lispro Injectable (HumaLOG) 7 Unit(s) SubCutaneous three times a day before meals  labetalol 100 milliGRAM(s) Oral three times a day  levothyroxine 88 MICROGram(s) Oral daily    MEDICATIONS  (PRN):  benzocaine 15 mG/menthol 3.6 mG Lozenge 1 Lozenge Oral four times a day PRN Sore Throat  dextrose Gel 1 Dose(s) Oral once PRN Blood Glucose LESS THAN 70 milliGRAM(s)/deciliter  dextrose Gel 1 Dose(s) Oral once PRN Blood Glucose LESS THAN 70 milliGRAM(s)/deciLiter  glucagon  Injectable 1 milliGRAM(s) IntraMuscular once PRN Glucose LESS THAN 70 milligrams/deciliter  glucagon  Injectable 1 milliGRAM(s) IntraMuscular once PRN Glucose <70 milliGRAM(s)/deciLiter

## 2017-10-20 NOTE — PROGRESS NOTE ADULT - PROBLEM SELECTOR PLAN 6
Likely 2/2 DKA and stress reaction vs. less likely infection- now resolved.  - continue monitoring, no indiciation for antibiotics Likely 2/2 profound dehydration from DKA, now improving with downtrending creatinine, however patient still volume depleted given very dry mucous membranes and suboptimal PO intake  - NS bolus x 1 with maintenance IVF hydration as above  - encourage PO intake  - monitor Cr

## 2017-10-20 NOTE — CHART NOTE - NSCHARTNOTEFT_GEN_A_CORE
Patient is a transfer from MICU, admitted with DKA, plan was to start to insulin pump at 8 am , order was written for Novolog  which is not available. now the order changed to humalog . Attestation received from the patient. [facility]. JAHAIRA Barney the primary  attending.  Romana Morley NP  [phone]

## 2017-10-21 DIAGNOSIS — E10.22 TYPE 1 DIABETES MELLITUS WITH DIABETIC CHRONIC KIDNEY DISEASE: ICD-10-CM

## 2017-10-21 DIAGNOSIS — R07.0 PAIN IN THROAT: ICD-10-CM

## 2017-10-21 DIAGNOSIS — R06.2 WHEEZING: ICD-10-CM

## 2017-10-21 LAB
ANION GAP SERPL CALC-SCNC: 13 MMOL/L — SIGNIFICANT CHANGE UP (ref 5–17)
BASOPHILS # BLD AUTO: 0.01 K/UL — SIGNIFICANT CHANGE UP (ref 0–0.2)
BASOPHILS NFR BLD AUTO: 0.1 % — SIGNIFICANT CHANGE UP (ref 0–2)
BUN SERPL-MCNC: 22 MG/DL — SIGNIFICANT CHANGE UP (ref 7–23)
CALCIUM SERPL-MCNC: 9.1 MG/DL — SIGNIFICANT CHANGE UP (ref 8.4–10.5)
CHLORIDE SERPL-SCNC: 107 MMOL/L — SIGNIFICANT CHANGE UP (ref 96–108)
CO2 SERPL-SCNC: 24 MMOL/L — SIGNIFICANT CHANGE UP (ref 22–31)
CREAT SERPL-MCNC: 1.37 MG/DL — HIGH (ref 0.5–1.3)
EOSINOPHIL # BLD AUTO: 0.41 K/UL — SIGNIFICANT CHANGE UP (ref 0–0.5)
EOSINOPHIL NFR BLD AUTO: 5.5 % — SIGNIFICANT CHANGE UP (ref 0–6)
GLUCOSE BLDC GLUCOMTR-MCNC: 106 MG/DL — HIGH (ref 70–99)
GLUCOSE BLDC GLUCOMTR-MCNC: 141 MG/DL — HIGH (ref 70–99)
GLUCOSE BLDC GLUCOMTR-MCNC: 156 MG/DL — HIGH (ref 70–99)
GLUCOSE BLDC GLUCOMTR-MCNC: 206 MG/DL — HIGH (ref 70–99)
GLUCOSE SERPL-MCNC: 97 MG/DL — SIGNIFICANT CHANGE UP (ref 70–99)
HCT VFR BLD CALC: 33.6 % — LOW (ref 39–50)
HGB BLD-MCNC: 11.3 G/DL — LOW (ref 13–17)
IMM GRANULOCYTES NFR BLD AUTO: 0.1 % — SIGNIFICANT CHANGE UP (ref 0–1.5)
LYMPHOCYTES # BLD AUTO: 1.52 K/UL — SIGNIFICANT CHANGE UP (ref 1–3.3)
LYMPHOCYTES # BLD AUTO: 20.3 % — SIGNIFICANT CHANGE UP (ref 13–44)
MCHC RBC-ENTMCNC: 31.9 PG — SIGNIFICANT CHANGE UP (ref 27–34)
MCHC RBC-ENTMCNC: 33.6 GM/DL — SIGNIFICANT CHANGE UP (ref 32–36)
MCV RBC AUTO: 94.9 FL — SIGNIFICANT CHANGE UP (ref 80–100)
MONOCYTES # BLD AUTO: 0.73 K/UL — SIGNIFICANT CHANGE UP (ref 0–0.9)
MONOCYTES NFR BLD AUTO: 9.8 % — SIGNIFICANT CHANGE UP (ref 2–14)
NEUTROPHILS # BLD AUTO: 4.8 K/UL — SIGNIFICANT CHANGE UP (ref 1.8–7.4)
NEUTROPHILS NFR BLD AUTO: 64.2 % — SIGNIFICANT CHANGE UP (ref 43–77)
PLATELET # BLD AUTO: 161 K/UL — SIGNIFICANT CHANGE UP (ref 150–400)
POTASSIUM SERPL-MCNC: 4.9 MMOL/L — SIGNIFICANT CHANGE UP (ref 3.5–5.3)
POTASSIUM SERPL-SCNC: 4.9 MMOL/L — SIGNIFICANT CHANGE UP (ref 3.5–5.3)
RBC # BLD: 3.54 M/UL — LOW (ref 4.2–5.8)
RBC # FLD: 13 % — SIGNIFICANT CHANGE UP (ref 10.3–14.5)
SODIUM SERPL-SCNC: 144 MMOL/L — SIGNIFICANT CHANGE UP (ref 135–145)
WBC # BLD: 7.48 K/UL — SIGNIFICANT CHANGE UP (ref 3.8–10.5)
WBC # FLD AUTO: 7.48 K/UL — SIGNIFICANT CHANGE UP (ref 3.8–10.5)

## 2017-10-21 PROCEDURE — 99233 SBSQ HOSP IP/OBS HIGH 50: CPT

## 2017-10-21 PROCEDURE — 99232 SBSQ HOSP IP/OBS MODERATE 35: CPT

## 2017-10-21 RX ORDER — DIPHENHYDRAMINE HYDROCHLORIDE AND LIDOCAINE HYDROCHLORIDE AND ALUMINUM HYDROXIDE AND MAGNESIUM HYDRO
10 KIT ONCE
Qty: 0 | Refills: 0 | Status: COMPLETED | OUTPATIENT
Start: 2017-10-21 | End: 2017-10-21

## 2017-10-21 RX ORDER — IPRATROPIUM/ALBUTEROL SULFATE 18-103MCG
3 AEROSOL WITH ADAPTER (GRAM) INHALATION EVERY 6 HOURS
Qty: 0 | Refills: 0 | Status: DISCONTINUED | OUTPATIENT
Start: 2017-10-21 | End: 2017-10-22

## 2017-10-21 RX ADMIN — Medication 88 MICROGRAM(S): at 05:13

## 2017-10-21 RX ADMIN — HEPARIN SODIUM 5000 UNIT(S): 5000 INJECTION INTRAVENOUS; SUBCUTANEOUS at 14:05

## 2017-10-21 RX ADMIN — AMLODIPINE BESYLATE 10 MILLIGRAM(S): 2.5 TABLET ORAL at 05:14

## 2017-10-21 RX ADMIN — Medication 81 MILLIGRAM(S): at 11:04

## 2017-10-21 RX ADMIN — Medication 100 MILLIGRAM(S): at 17:47

## 2017-10-21 RX ADMIN — BENZOCAINE AND MENTHOL 1 LOZENGE: 5; 1 LIQUID ORAL at 11:04

## 2017-10-21 RX ADMIN — Medication 3 MILLILITER(S): at 17:48

## 2017-10-21 RX ADMIN — Medication 100 MILLIGRAM(S): at 08:46

## 2017-10-21 RX ADMIN — Medication 30 MILLILITER(S): at 11:04

## 2017-10-21 RX ADMIN — ATORVASTATIN CALCIUM 20 MILLIGRAM(S): 80 TABLET, FILM COATED ORAL at 21:17

## 2017-10-21 RX ADMIN — Medication 3 MILLILITER(S): at 11:04

## 2017-10-21 RX ADMIN — PANTOPRAZOLE SODIUM 40 MILLIGRAM(S): 20 TABLET, DELAYED RELEASE ORAL at 05:14

## 2017-10-21 RX ADMIN — DIPHENHYDRAMINE HYDROCHLORIDE AND LIDOCAINE HYDROCHLORIDE AND ALUMINUM HYDROXIDE AND MAGNESIUM HYDRO 10 MILLILITER(S): KIT at 11:04

## 2017-10-21 RX ADMIN — HEPARIN SODIUM 5000 UNIT(S): 5000 INJECTION INTRAVENOUS; SUBCUTANEOUS at 21:17

## 2017-10-21 RX ADMIN — HEPARIN SODIUM 5000 UNIT(S): 5000 INJECTION INTRAVENOUS; SUBCUTANEOUS at 05:14

## 2017-10-21 NOTE — PROGRESS NOTE ADULT - PROBLEM SELECTOR PLAN 3
c/w insuiln pump and dosing per endocrine  FS better controlled   f/u with endocrine regarding insulin pump dosing prior to discharge

## 2017-10-21 NOTE — PHYSICAL THERAPY INITIAL EVALUATION ADULT - PERTINENT HX OF CURRENT PROBLEM, REHAB EVAL
as per chart review: PMH of type 1 DM, HTN, and HLD with hyperglycemia. The patient received 5L of NS in the ED and was started on a insulin gtt at 6. uncontrolled Type 1 DM on omnipod insulin pump a/w DKA  Pump resumed today (high risk patient with high level decision-making)

## 2017-10-21 NOTE — PROGRESS NOTE ADULT - SUBJECTIVE AND OBJECTIVE BOX
Chief Complaint: DM1, uncontrolled, on insulin pump.    S: Pt. feeling overall better. His appetite is improved and he is no longer having N/V. He is c/o some throat pain and trouble swallowing.  Also notes discrepancy of > 30 units b/w hospital and personal glucometer.    MEDICATIONS  (STANDING):  ALBUTerol/ipratropium for Nebulization 3 milliLiter(s) Nebulizer every 6 hours  amLODIPine   Tablet 10 milliGRAM(s) Oral daily  aspirin enteric coated 81 milliGRAM(s) Oral daily  atorvastatin 20 milliGRAM(s) Oral at bedtime  dextrose 5%. 1000 milliLiter(s) (50 mL/Hr) IV Continuous <Continuous>  dextrose 50% Injectable 12.5 Gram(s) IV Push once  dextrose 50% Injectable 25 Gram(s) IV Push once  dextrose 50% Injectable 25 Gram(s) IV Push once  dextrose 50% Injectable 12.5 Gram(s) IV Push once  dextrose 50% Injectable 25 Gram(s) IV Push once  dextrose 50% Injectable 25 Gram(s) IV Push once  heparin  Injectable 5000 Unit(s) SubCutaneous every 8 hours  insulin lispro (HumaLOG) Pump 1 Each SubCutaneous Continuous Pump  labetalol 100 milliGRAM(s) Oral three times a day  levothyroxine 88 MICROGram(s) Oral daily  pantoprazole    Tablet 40 milliGRAM(s) Oral before breakfast  sodium chloride 0.9%. 1000 milliLiter(s) (115 mL/Hr) IV Continuous <Continuous>      Allergies    No Known Allergies    Intolerances      Review of Systems:  Constitutional: No fever  Eyes: No blurry vision  Neuro: No tremors  HEENT: No pain  Cardiovascular: No chest pain, palpitations  Respiratory: No SOB, no cough  GI: No nausea, vomiting, abdominal pain  : No dysuria  Skin: no rash  Psych: no depression  Endocrine: no polyuria, polydipsia  Hem/lymph: no swelling  Osteoporosis: no fractures    PHYSICAL EXAM:  VITALS: T(C): 36.9 (10-21-17 @ 07:45)  T(F): 98.5 (10-21-17 @ 07:45), Max: 99.3 (10-20-17 @ 21:00)  HR: 70 (10-21-17 @ 07:45) (70 - 83)  BP: 138/62 (10-21-17 @ 07:45) (138/62 - 164/79)  RR:  (18 - 20)  SpO2:  (94% - 97%)  Wt(kg): --  GENERAL: NAD, well-groomed, well-developed  EYES: No proptosis, anicteric  HEENT:  Atraumatic, Normocephalic, moist mucous membranes  RESPIRATORY: Clear to auscultation bilaterally  CARDIOVASCULAR: Regular rate and rhythm; no peripheral edema  GI: Soft, nontender, non distended  SKIN: Dry, intact, No rashes or lesions  MUSCULOSKELETAL: Full range of motion  NEURO:  no tremor  PSYCH: Alert and oriented x 3, normal affect, normal mood      CAPILLARY BLOOD GLUCOSE  127 (10-20 @ 21:30)  215 (10-20 @ 16:41)  351 (10-20 @ 12:17)  230 (10-20 @ 08:23)  131 (10-20 @ 01:00)  117 (10-19 @ 23:00)  86 (10-19 @ 22:00)  115 (10-19 @ 18:00)  143 (10-19 @ 16:00)  161 (10-19 @ 15:00)  183 (10-19 @ 14:00)  165 (10-19 @ 13:00)  185 (10-19 @ 12:00)  204 (10-19 @ 11:00)  220 (10-19 @ 10:00)  214 (10-19 @ 09:00)  229 (10-19 @ 08:00)  263 (10-19 @ 07:00)  288 (10-19 @ 06:00)  248 (10-19 @ 05:00)  346 (10-19 @ 04:00)  367 (10-19 @ 01:00)  319 (10-18 @ 22:00)  243 (10-18 @ 18:00)  186 (10-18 @ 15:00)  167 (10-18 @ 14:30)  203 (10-18 @ 14:00)  184 (10-18 @ 13:00)      10-21    144  |  107  |  22  ----------------------------<  97  4.9   |  24  |  1.37<H>    EGFR if : 63  EGFR if non : 54<L>    Ca    9.1      10-21  Mg     1.8     10-20  Phos  2.2     10-20    TPro  6.1  /  Alb  3.2<L>  /  TBili  0.7  /  DBili  x   /  AST  119<H>  /  ALT  118<H>  /  AlkPhos  102  10-20          Thyroid Function Tests:  10-17 @ 12:42 TSH 2.43 FreeT4 -- T3 -- Anti TPO -- Anti Thyroglobulin Ab -- TSI --      Hemoglobin A1C, Whole Blood: 7.7 % <H> [4.0 - 5.6] (10-17-17 @ 20:23)

## 2017-10-21 NOTE — PROGRESS NOTE ADULT - PROBLEM SELECTOR PLAN 1
Resolved. AG closed, -200, no nausea/vomiting.   -was transitioned to insulin pump yesterday per endocrine who is following/adjusting doses  - continue monitoring fingersticks  -

## 2017-10-21 NOTE — PROGRESS NOTE ADULT - PROBLEM SELECTOR PLAN 8
Likely 2/2 DKA and stress reaction vs. less likely infection- now resolved.  - continue monitoring, no indication for antibiotics

## 2017-10-21 NOTE — PROGRESS NOTE ADULT - SUBJECTIVE AND OBJECTIVE BOX
Patient is a 62y old  Male who presents with a chief complaint of Hyperglycemia (17 Oct 2017 14:08)      SUBJECTIVE / OVERNIGHT EVENTS: No o/n events. Patient has burning in his throat which starting after vomiting several times the other day. No runny nose, cough, fever or dysphagia. Feels better overall, no abd pain, n/v and wants to go home today.    MEDICATIONS  (STANDING):  ALBUTerol/ipratropium for Nebulization 3 milliLiter(s) Nebulizer every 6 hours  amLODIPine   Tablet 10 milliGRAM(s) Oral daily  aspirin enteric coated 81 milliGRAM(s) Oral daily  atorvastatin 20 milliGRAM(s) Oral at bedtime  dextrose 5%. 1000 milliLiter(s) (50 mL/Hr) IV Continuous <Continuous>  dextrose 50% Injectable 12.5 Gram(s) IV Push once  dextrose 50% Injectable 25 Gram(s) IV Push once  dextrose 50% Injectable 25 Gram(s) IV Push once  dextrose 50% Injectable 12.5 Gram(s) IV Push once  dextrose 50% Injectable 25 Gram(s) IV Push once  dextrose 50% Injectable 25 Gram(s) IV Push once  FIRST- Mouthwash  BLM 10 milliLiter(s) Swish and Swallow once  heparin  Injectable 5000 Unit(s) SubCutaneous every 8 hours  insulin lispro (HumaLOG) Pump 1 Each SubCutaneous Continuous Pump  labetalol 100 milliGRAM(s) Oral three times a day  levothyroxine 88 MICROGram(s) Oral daily  pantoprazole    Tablet 40 milliGRAM(s) Oral before breakfast  sodium chloride 0.9%. 1000 milliLiter(s) (115 mL/Hr) IV Continuous <Continuous>    MEDICATIONS  (PRN):  aluminum hydroxide/magnesium hydroxide/simethicone Suspension 30 milliLiter(s) Oral once PRN Dyspepsia  benzocaine 15 mG/menthol 3.6 mG Lozenge 1 Lozenge Oral four times a day PRN Sore Throat  calcium carbonate 500 mG (Tums) Chewable 1 Tablet(s) Chew four times a day PRN Heartburn  dextrose Gel 1 Dose(s) Oral once PRN Blood Glucose LESS THAN 70 milliGRAM(s)/deciliter  dextrose Gel 1 Dose(s) Oral once PRN Blood Glucose LESS THAN 70 milliGRAM(s)/deciLiter  glucagon  Injectable 1 milliGRAM(s) IntraMuscular once PRN Glucose LESS THAN 70 milligrams/deciliter  glucagon  Injectable 1 milliGRAM(s) IntraMuscular once PRN Glucose <70 milliGRAM(s)/deciLiter      Vital Signs Last 24 Hrs  T(C): 36.9 (21 Oct 2017 07:45), Max: 37.4 (20 Oct 2017 21:00)  T(F): 98.5 (21 Oct 2017 07:45), Max: 99.3 (20 Oct 2017 21:00)  HR: 70 (21 Oct 2017 07:45) (70 - 83)  BP: 138/62 (21 Oct 2017 07:45) (138/62 - 164/79)  BP(mean): --  RR: 18 (21 Oct 2017 07:45) (18 - 20)  SpO2: 97% (21 Oct 2017 07:45) (94% - 97%)  CAPILLARY BLOOD GLUCOSE  127 (20 Oct 2017 21:30)  215 (20 Oct 2017 16:41)  351 (20 Oct 2017 12:17)      POCT Blood Glucose.: 106 mg/dL (21 Oct 2017 07:53)  POCT Blood Glucose.: 127 mg/dL (20 Oct 2017 21:30)  POCT Blood Glucose.: 215 mg/dL (20 Oct 2017 16:38)  POCT Blood Glucose.: 351 mg/dL (20 Oct 2017 12:15)    I&O's Summary    20 Oct 2017 07:01  -  21 Oct 2017 07:00  --------------------------------------------------------  IN: 1640 mL / OUT: 2075 mL / NET: -435 mL    21 Oct 2017 07:01  -  21 Oct 2017 10:56  --------------------------------------------------------  IN: 320 mL / OUT: 300 mL / NET: 20 mL        PHYSICAL EXAM:  GENERAL: NAD, well-developed  HEAD:  Atraumatic, Normocephalic  Mouth: erythema in soft palate and tonsils, no exudates  Neck: no LAD  CHEST/LUNG: faint wheezing in b/l bases but good air entry otherwise  HEART: Regular rate and rhythm; No murmurs, rubs, or gallops  ABDOMEN: Soft, Nontender, Nondistended; Bowel sounds present  EXTREMITIES:  2+ Peripheral Pulses, No clubbing, cyanosis, or edema  PSYCH: AAOx3  NEUROLOGY: non-focal  SKIN: No rashes or lesions    LABS:                        11.3   7.48  )-----------( 161      ( 21 Oct 2017 08:48 )             33.6     10-21    144  |  107  |  22  ----------------------------<  97  4.9   |  24  |  1.37<H>    Ca    9.1      21 Oct 2017 08:59  Phos  2.2     10-20  Mg     1.8     10-20    TPro  6.1  /  Alb  3.2<L>  /  TBili  0.7  /  DBili  x   /  AST  119<H>  /  ALT  118<H>  /  AlkPhos  102  10-20            RADIOLOGY & ADDITIONAL TESTS:    Imaging Personally Reviewed:    Consultant(s) Notes Reviewed:  endocrine, MICU    Care Discussed with Consultants/Other Providers:

## 2017-10-21 NOTE — PROGRESS NOTE ADULT - ASSESSMENT
62m with PMH of type 1 DM, HTN, and HLD  admitted to MICU for management of DKA, now resolved and transferred for medicine for further management

## 2017-10-21 NOTE — PROGRESS NOTE ADULT - PROBLEM SELECTOR PLAN 6
resolved, pre-renal due to profound dehydration from DKA, creatinine back to baseline CKD Stage 3 status  -can d/c IVF today  - encourage PO intake

## 2017-10-21 NOTE — PROGRESS NOTE ADULT - PROBLEM SELECTOR PLAN 7
controlled  - Continue amlodipine and labetalol  - hold enalapril 20mg po daily for now given patient at risk for worsening LEONRAD, can restart as outpatient

## 2017-10-21 NOTE — PHYSICAL THERAPY INITIAL EVALUATION ADULT - LIVES WITH, PROFILE
currently  & lives alone in apartment with 8 stairs to enter, will be staying with his wife after discharge from hospital

## 2017-10-21 NOTE — PROGRESS NOTE ADULT - ATTENDING COMMENTS
consider discharge home later today vs efren if wheezing improves and if endocrine in agreement  discharge time 45 minutes

## 2017-10-21 NOTE — PROGRESS NOTE ADULT - PROBLEM SELECTOR PLAN 1
DKA resolved  Placed back on pump yesterday morning. Tolerating PO, w/ good appetite.  Now back to regular home setting, w/ one elevated FS >200 before lunch. Will cont. current settings for now.  Cont. to monitor FS's TIDAC/qhs.  Goal glucose 100-180

## 2017-10-21 NOTE — PROGRESS NOTE ADULT - PROBLEM SELECTOR PLAN 5
faint wheezing but no sob or chest pain at rest or exertion, reports remote hx of ashtma but not on any meds.   received some duonebs in MICU, can restart now  rec albuterol pump at home and PMD followup

## 2017-10-21 NOTE — PROGRESS NOTE ADULT - PROBLEM SELECTOR PLAN 2
Present on admission, likely 2/2 to DKA, now resolved. Received empiric antibiotics in micu but no overt infectious source identified or currently suspected.   - continue monitoring off antibiotics

## 2017-10-21 NOTE — PROGRESS NOTE ADULT - ASSESSMENT
63 y/o M w/ uncontrolled Type 1 DM on omnipod insulin pump a/w DKA  Pump resumed yesterday at 120% temp basal (high risk patient with high level decision-making).

## 2017-10-21 NOTE — PROGRESS NOTE ADULT - PROBLEM SELECTOR PLAN 4
due to multiple episodes of vomiting with DKA, RVP negative with no URI symptoms  -Cepacol not improving pain much  -will try magic mouthwash and maalox

## 2017-10-22 ENCOUNTER — TRANSCRIPTION ENCOUNTER (OUTPATIENT)
Age: 62
End: 2017-10-22

## 2017-10-22 VITALS
OXYGEN SATURATION: 99 % | HEART RATE: 82 BPM | SYSTOLIC BLOOD PRESSURE: 129 MMHG | DIASTOLIC BLOOD PRESSURE: 70 MMHG | RESPIRATION RATE: 18 BRPM | TEMPERATURE: 98 F

## 2017-10-22 LAB
ANION GAP SERPL CALC-SCNC: 11 MMOL/L — SIGNIFICANT CHANGE UP (ref 5–17)
BUN SERPL-MCNC: 17 MG/DL — SIGNIFICANT CHANGE UP (ref 7–23)
CALCIUM SERPL-MCNC: 8.9 MG/DL — SIGNIFICANT CHANGE UP (ref 8.4–10.5)
CHLORIDE SERPL-SCNC: 104 MMOL/L — SIGNIFICANT CHANGE UP (ref 96–108)
CO2 SERPL-SCNC: 24 MMOL/L — SIGNIFICANT CHANGE UP (ref 22–31)
CREAT SERPL-MCNC: 1.23 MG/DL — SIGNIFICANT CHANGE UP (ref 0.5–1.3)
CULTURE RESULTS: SIGNIFICANT CHANGE UP
GLUCOSE BLDC GLUCOMTR-MCNC: 119 MG/DL — HIGH (ref 70–99)
GLUCOSE BLDC GLUCOMTR-MCNC: 191 MG/DL — HIGH (ref 70–99)
GLUCOSE BLDC GLUCOMTR-MCNC: 195 MG/DL — HIGH (ref 70–99)
GLUCOSE BLDC GLUCOMTR-MCNC: 206 MG/DL — HIGH (ref 70–99)
GLUCOSE SERPL-MCNC: 148 MG/DL — HIGH (ref 70–99)
POTASSIUM SERPL-MCNC: 3.9 MMOL/L — SIGNIFICANT CHANGE UP (ref 3.5–5.3)
POTASSIUM SERPL-SCNC: 3.9 MMOL/L — SIGNIFICANT CHANGE UP (ref 3.5–5.3)
SODIUM SERPL-SCNC: 139 MMOL/L — SIGNIFICANT CHANGE UP (ref 135–145)
SPECIMEN SOURCE: SIGNIFICANT CHANGE UP

## 2017-10-22 PROCEDURE — 87150 DNA/RNA AMPLIFIED PROBE: CPT

## 2017-10-22 PROCEDURE — 84540 ASSAY OF URINE/UREA-N: CPT

## 2017-10-22 PROCEDURE — 87086 URINE CULTURE/COLONY COUNT: CPT

## 2017-10-22 PROCEDURE — 82553 CREATINE MB FRACTION: CPT

## 2017-10-22 PROCEDURE — 85610 PROTHROMBIN TIME: CPT

## 2017-10-22 PROCEDURE — 80307 DRUG TEST PRSMV CHEM ANLYZR: CPT

## 2017-10-22 PROCEDURE — 97161 PT EVAL LOW COMPLEX 20 MIN: CPT

## 2017-10-22 PROCEDURE — 82533 TOTAL CORTISOL: CPT

## 2017-10-22 PROCEDURE — 99285 EMERGENCY DEPT VISIT HI MDM: CPT | Mod: 25

## 2017-10-22 PROCEDURE — 94640 AIRWAY INHALATION TREATMENT: CPT

## 2017-10-22 PROCEDURE — 84484 ASSAY OF TROPONIN QUANT: CPT

## 2017-10-22 PROCEDURE — 82330 ASSAY OF CALCIUM: CPT

## 2017-10-22 PROCEDURE — 87449 NOS EACH ORGANISM AG IA: CPT

## 2017-10-22 PROCEDURE — 84156 ASSAY OF PROTEIN URINE: CPT

## 2017-10-22 PROCEDURE — 70450 CT HEAD/BRAIN W/O DYE: CPT

## 2017-10-22 PROCEDURE — 84132 ASSAY OF SERUM POTASSIUM: CPT

## 2017-10-22 PROCEDURE — 74176 CT ABD & PELVIS W/O CONTRAST: CPT

## 2017-10-22 PROCEDURE — 82009 KETONE BODYS QUAL: CPT

## 2017-10-22 PROCEDURE — 85027 COMPLETE CBC AUTOMATED: CPT

## 2017-10-22 PROCEDURE — 80053 COMPREHEN METABOLIC PANEL: CPT

## 2017-10-22 PROCEDURE — 84133 ASSAY OF URINE POTASSIUM: CPT

## 2017-10-22 PROCEDURE — 82803 BLOOD GASES ANY COMBINATION: CPT

## 2017-10-22 PROCEDURE — 83605 ASSAY OF LACTIC ACID: CPT

## 2017-10-22 PROCEDURE — 99239 HOSP IP/OBS DSCHRG MGMT >30: CPT

## 2017-10-22 PROCEDURE — 96374 THER/PROPH/DIAG INJ IV PUSH: CPT

## 2017-10-22 PROCEDURE — 87040 BLOOD CULTURE FOR BACTERIA: CPT

## 2017-10-22 PROCEDURE — 87486 CHLMYD PNEUM DNA AMP PROBE: CPT

## 2017-10-22 PROCEDURE — 71045 X-RAY EXAM CHEST 1 VIEW: CPT

## 2017-10-22 PROCEDURE — 82947 ASSAY GLUCOSE BLOOD QUANT: CPT

## 2017-10-22 PROCEDURE — 85014 HEMATOCRIT: CPT

## 2017-10-22 PROCEDURE — 84560 ASSAY OF URINE/URIC ACID: CPT

## 2017-10-22 PROCEDURE — 80048 BASIC METABOLIC PNL TOTAL CA: CPT

## 2017-10-22 PROCEDURE — 93306 TTE W/DOPPLER COMPLETE: CPT

## 2017-10-22 PROCEDURE — 83036 HEMOGLOBIN GLYCOSYLATED A1C: CPT

## 2017-10-22 PROCEDURE — 83935 ASSAY OF URINE OSMOLALITY: CPT

## 2017-10-22 PROCEDURE — 83735 ASSAY OF MAGNESIUM: CPT

## 2017-10-22 PROCEDURE — 82435 ASSAY OF BLOOD CHLORIDE: CPT

## 2017-10-22 PROCEDURE — 82436 ASSAY OF URINE CHLORIDE: CPT

## 2017-10-22 PROCEDURE — 85730 THROMBOPLASTIN TIME PARTIAL: CPT

## 2017-10-22 PROCEDURE — 84100 ASSAY OF PHOSPHORUS: CPT

## 2017-10-22 PROCEDURE — 83690 ASSAY OF LIPASE: CPT

## 2017-10-22 PROCEDURE — 81001 URINALYSIS AUTO W/SCOPE: CPT

## 2017-10-22 PROCEDURE — 84295 ASSAY OF SERUM SODIUM: CPT

## 2017-10-22 PROCEDURE — 82550 ASSAY OF CK (CPK): CPT

## 2017-10-22 PROCEDURE — 80061 LIPID PANEL: CPT

## 2017-10-22 PROCEDURE — 82565 ASSAY OF CREATININE: CPT

## 2017-10-22 PROCEDURE — 84145 PROCALCITONIN (PCT): CPT

## 2017-10-22 PROCEDURE — 84105 ASSAY OF URINE PHOSPHORUS: CPT

## 2017-10-22 PROCEDURE — 87205 SMEAR GRAM STAIN: CPT

## 2017-10-22 PROCEDURE — 93005 ELECTROCARDIOGRAM TRACING: CPT

## 2017-10-22 PROCEDURE — 84443 ASSAY THYROID STIM HORMONE: CPT

## 2017-10-22 PROCEDURE — 83874 ASSAY OF MYOGLOBIN: CPT

## 2017-10-22 PROCEDURE — 82962 GLUCOSE BLOOD TEST: CPT

## 2017-10-22 PROCEDURE — 82570 ASSAY OF URINE CREATININE: CPT

## 2017-10-22 PROCEDURE — 87633 RESP VIRUS 12-25 TARGETS: CPT

## 2017-10-22 PROCEDURE — 87798 DETECT AGENT NOS DNA AMP: CPT

## 2017-10-22 PROCEDURE — 96372 THER/PROPH/DIAG INJ SC/IM: CPT | Mod: XU

## 2017-10-22 PROCEDURE — 87581 M.PNEUMON DNA AMP PROBE: CPT

## 2017-10-22 PROCEDURE — 84300 ASSAY OF URINE SODIUM: CPT

## 2017-10-22 PROCEDURE — 82010 KETONE BODYS QUAN: CPT

## 2017-10-22 RX ORDER — ISOPROPYL ALCOHOL, BENZOCAINE .7; .06 ML/ML; ML/ML
0 SWAB TOPICAL
Qty: 0 | Refills: 0 | COMMUNITY

## 2017-10-22 RX ORDER — INSULIN ASPART 100 [IU]/ML
0 INJECTION, SOLUTION SUBCUTANEOUS
Qty: 0 | Refills: 0 | COMMUNITY

## 2017-10-22 RX ORDER — IPRATROPIUM/ALBUTEROL SULFATE 18-103MCG
1 AEROSOL WITH ADAPTER (GRAM) INHALATION
Qty: 4 | Refills: 0 | OUTPATIENT
Start: 2017-10-22 | End: 2017-11-21

## 2017-10-22 RX ORDER — INSULIN LISPRO 100/ML
1 VIAL (ML) SUBCUTANEOUS
Qty: 10 | Refills: 0 | OUTPATIENT
Start: 2017-10-22 | End: 2017-11-21

## 2017-10-22 RX ORDER — LABETALOL HCL 100 MG
1 TABLET ORAL
Qty: 0 | Refills: 0 | COMMUNITY
Start: 2017-10-22

## 2017-10-22 RX ORDER — IPRATROPIUM/ALBUTEROL SULFATE 18-103MCG
1 AEROSOL WITH ADAPTER (GRAM) INHALATION
Qty: 1 | Refills: 0 | OUTPATIENT
Start: 2017-10-22 | End: 2017-11-21

## 2017-10-22 RX ORDER — ISOPROPYL ALCOHOL, BENZOCAINE .7; .06 ML/ML; ML/ML
1 SWAB TOPICAL
Qty: 1 | Refills: 0 | OUTPATIENT
Start: 2017-10-22

## 2017-10-22 RX ADMIN — PANTOPRAZOLE SODIUM 40 MILLIGRAM(S): 20 TABLET, DELAYED RELEASE ORAL at 05:55

## 2017-10-22 RX ADMIN — Medication 3 MILLILITER(S): at 00:15

## 2017-10-22 RX ADMIN — Medication 3 MILLILITER(S): at 12:57

## 2017-10-22 RX ADMIN — Medication 100 MILLIGRAM(S): at 02:18

## 2017-10-22 RX ADMIN — Medication 3 MILLILITER(S): at 05:54

## 2017-10-22 RX ADMIN — Medication 88 MICROGRAM(S): at 05:55

## 2017-10-22 RX ADMIN — AMLODIPINE BESYLATE 10 MILLIGRAM(S): 2.5 TABLET ORAL at 05:55

## 2017-10-22 RX ADMIN — Medication 81 MILLIGRAM(S): at 12:57

## 2017-10-22 RX ADMIN — HEPARIN SODIUM 5000 UNIT(S): 5000 INJECTION INTRAVENOUS; SUBCUTANEOUS at 05:55

## 2017-10-22 RX ADMIN — Medication 100 MILLIGRAM(S): at 08:31

## 2017-10-22 NOTE — PROGRESS NOTE ADULT - PROBLEM SELECTOR PLAN 10
- ppx: hsq  - diet: DASH/TLC, consistent carbs  - PT rec outpatient PT
- ppx: hsq  - diet: DASH/TLC, consistent carbs  - PT rec outpatient PT

## 2017-10-22 NOTE — DISCHARGE NOTE ADULT - HOSPITAL COURSE
per HPI: "Pt is a 62 year old M w/ a PMH of type 1 DM, HTN, and HLD that presents with hyperglycemia. As per the patient he is usually managed well on a insulin pump that is controlled by a external controller that tells him when his pump is empty. 2 days PTA misplaced his controller while visiting [place].  He attempted to give himself a bolus of insulin at night, however, in the morning his finger stick was still "high" and he was feeling confused and disoriented He was able to call EMS, and on their arrival the patient was confused so he was bought in the [facility]. He denies fevers, cough, rash, dysuria, abd pain, N/V, diarrhea, constipation, headache, chest pain, or SOB. He denies any recent alcohol or drug use.   In the ED the patients vitals were: 97.0, 81, 103/54, 20, 100% on RA. He was found to have a FS that was "high." He was given 10 units of subcutaneous regular insulin. He then had a BMP which showed a anion gap of 41 with a bicarb of 5. pH on VBG was 6.99 and there was acetone in the blood. WBC was 34 and lactate was 6.3. The patient received 5L of NS in the ED and was started on a insulin gtt at 6. "    Hospital Course: Patient was admitted to MICU for DKA with Type 1 Diabetes and rhabdomyolysis LEONARD, metabolic acidosis. His AG was initially 41 with metabolic acidosis ph 6.9 so was started on insulin ggt and IVF until his AG closed and ph improved. He was titrated off back on to lantus and aspart with improvement of his symptoms and sent to floors. He also had LEONARD which was prerenal on admission with creatinine into the 3's which downtrended back to his baseline CKD Stage 3 values by discharged. Endocrine followed and transitioned him to his insulin pump home dosage setting and he had controlled FS throughout. Hospital course was complicated by elevated troponin with no chest pain and normal 2d echo and was thought to be secondary to demand ischemia as trops downtrended. Patient also mets criteria for SIRS and was started on antibiotics initially but then observed off as he had no signs of infection and negative one botttle of bcx came back as coag negative staph which was a contaminant He had with improvement of his wbc. Patient was noted to have mild wheezing which he said he has a remote hx of asthma and will follow up with his pmd for. Additionally he had throat burning due to vomiting from presentation which improved with maalox, cepacol, and magic mouth. RVP was negative. Patient felt well with improvement of his FS and endocrine recommended discharge. He was given a new glucometer and supplies as his machine was overreading FS despite attempted calibration. He will follow up with his PMD and endocrinologist this week.    Discharge Diagnosis:  DKA (diabetic ketoacidoses)  Metabolic Acidosis  Rhabdomyolysis  Elevated troponin  Type 1 diabetes mellitus with stage 3 chronic kidney disease  Throat burning  Wheezing  LEONARD - Prerenal  Leukocytosis  Hypothyroid per HPI: "Pt is a 62 year old M w/ a PMH of type 1 DM, HTN, and HLD that presents with hyperglycemia. As per the patient he is usually managed well on a insulin pump that is controlled by a external controller that tells him when his pump is empty. 2 days PTA misplaced his controller while visiting [place].  He attempted to give himself a bolus of insulin at night, however, in the morning his finger stick was still "high" and he was feeling confused and disoriented He was able to call EMS, and on their arrival the patient was confused so he was bought in the [facility]. He denies fevers, cough, rash, dysuria, abd pain, N/V, diarrhea, constipation, headache, chest pain, or SOB. He denies any recent alcohol or drug use.   In the ED the patients vitals were: 97.0, 81, 103/54, 20, 100% on RA. He was found to have a FS that was "high." He was given 10 units of subcutaneous regular insulin. He then had a BMP which showed a anion gap of 41 with a bicarb of 5. pH on VBG was 6.99 and there was acetone in the blood. WBC was 34 and lactate was 6.3. The patient received 5L of NS in the ED and was started on a insulin gtt at 6. "    Hospital Course: Patient was admitted to MICU for DKA with Type 1 Diabetes and rhabdomyolysis LEONARD, metabolic acidosis. His AG was initially 41 with metabolic acidosis ph 6.9 so was started on insulin ggt and IVF until his AG closed and ph improved. He was titrated off back on to lantus and aspart with improvement of his symptoms and sent to floors. He also had LEONARD which was prerenal on admission with creatinine into the 3's which downtrended back to his baseline CKD Stage 3 values by discharge. Endocrine followed and transitioned him to his insulin pump home dosage setting and he had controlled FS throughout. Hospital course was complicated by elevated troponin with no chest pain and with 2d echo showing normal LV function, mildy reduced RV function and was thought to be secondary to demand ischemia as trops downtrended and he never had chest pain with nonspecific ekg changes. Patient also mets criteria for SIRS and was started on antibiotics initially but then observed off as he had no signs of infection and negative one botttle of bcx came back as coag negative staph which was a contaminant He had with improvement of his wbc. Patient was noted to have mild wheezing which he said he has a remote hx of asthma and will follow up with his pmd for. Additionally he had throat burning due to vomiting from presentation which improved with maalox, cepacol, and magic mouth. RVP was negative. Patient felt well with improvement of his FS and endocrine recommended discharge. He was given a new glucometer and supplies as his machine was overreading FS despite attempted calibration. He will follow up with his PMD and endocrinologist this week.    Discharge Diagnosis:  DKA (diabetic ketoacidoses)  Metabolic Acidosis  Rhabdomyolysis  Elevated troponin  Type 1 diabetes mellitus with stage 3 chronic kidney disease  Throat burning  Wheezing  LEONARD - Prerenal  Leukocytosis  Hypothyroid

## 2017-10-22 NOTE — PROGRESS NOTE ADULT - PROVIDER SPECIALTY LIST ADULT
Endocrinology
Hospitalist
Hospitalist
MICU
MICU
Endocrinology
Internal Medicine

## 2017-10-22 NOTE — DISCHARGE NOTE ADULT - CARE PROVIDER_API CALL
Baljit Garcia), Gastroenterology; Internal Medicine  [address]  [place] [address]  Phone: [phone]  Fax: [phone]

## 2017-10-22 NOTE — PROGRESS NOTE ADULT - PROBLEM SELECTOR PLAN 9
continue synthroid 88mcg po daily
continue synthroid 88mcg po daily
- ppx: hsq  - diet: DASH/TLC, consistent carbs  - dispo: medical floor

## 2017-10-22 NOTE — PROGRESS NOTE ADULT - PROBLEM SELECTOR PROBLEM 1
DKA (diabetic ketoacidoses)
Type 1 diabetes mellitus with ketoacidosis without coma

## 2017-10-22 NOTE — DISCHARGE NOTE ADULT - MEDICATION SUMMARY - MEDICATIONS TO TAKE
I will START or STAY ON the medications listed below when I get home from the hospital:    verio test strips  -- Verio test strips for Verio glucometer  disp: 3 canisters  -- Indication: For Diabetes    Verio Glucometer  -- Verio glucometer: Use as directed  Disp: 1  -- Indication: For Diabetes    Lancets  -- Lancets  Disp: 100  -- Indication: For Diabetes    Alcohol swabs  -- Alcohol swabs  Disp: 1 box  -- Indication: For Diabetes mellitus of other type with ketoacidosis without coma    aspirin 81 mg oral tablet, chewable  -- 1 tab(s) by mouth once a day  -- Indication: For Need for prophylactic measure    enalapril 20 mg oral tablet  -- 1 tab(s) by mouth once a day  -- Indication: For HTN (hypertension)    HumaLOG 100 units/mL subcutaneous solution  -- 1 each subcutaneous 4 times a day  as per sliding scale when pump is not in use   -- Indication: For Diabetes    atorvastatin 20 mg oral tablet  -- 1 tab(s) by mouth once a day  -- Indication: For Hyperlipidemia    labetalol 100 mg oral tablet  -- 1 tab(s) by mouth 3 times a day  -- Indication: For HTN (hypertension)    ipratropium-albuterol CFC free 20 mcg-100 mcg/inh inhalation aerosol  -- 1 puff(s) inhaled 4 times a day MDD:4 puffs  -- Indication: For Wheezing    amLODIPine 10 mg oral tablet  -- 1 tab(s) by mouth once a day  -- Indication: For HTN (hypertension)    levothyroxine 88 mcg (0.088 mg) oral tablet  -- 1 tab(s) by mouth once a day  -- Indication: For Hypothyroid I will START or STAY ON the medications listed below when I get home from the hospital:    aspirin 81 mg oral tablet, chewable  -- 1 tab(s) by mouth once a day  -- Indication: For Need for prophylactic measure    enalapril 20 mg oral tablet  -- 1 tab(s) by mouth once a day  -- Indication: For HTN (hypertension)    HumaLOG 100 units/mL subcutaneous solution  -- 1 each subcutaneous 4 times a day  as per sliding scale when pump is not in use   -- Indication: For Diabetes    atorvastatin 20 mg oral tablet  -- 1 tab(s) by mouth once a day  -- Indication: For Hyperlipidemia    labetalol 100 mg oral tablet  -- 1 tab(s) by mouth 3 times a day  -- Indication: For HTN (hypertension)    ipratropium-albuterol CFC free 20 mcg-100 mcg/inh inhalation aerosol  -- 1 puff(s) inhaled 4 times a day MDD:4 puffs  -- Indication: For Wheezing    amLODIPine 10 mg oral tablet  -- 1 tab(s) by mouth once a day  -- Indication: For HTN (hypertension)    levothyroxine 88 mcg (0.088 mg) oral tablet  -- 1 tab(s) by mouth once a day  -- Indication: For Hypothyroid

## 2017-10-22 NOTE — DISCHARGE NOTE ADULT - CARE PROVIDERS DIRECT ADDRESSES
[email or web address]

## 2017-10-22 NOTE — DISCHARGE NOTE ADULT - PATIENT PORTAL LINK FT
“You can access the FollowHealth Patient Portal, offered by [facility], by registering with the following website: [email or web address]”

## 2017-10-22 NOTE — PROGRESS NOTE ADULT - PROBLEM SELECTOR PLAN 5
faint wheezing but no sob or chest pain at rest or exertion, reports remote hx of ashtma but not on any meds. no respiratory distress  rec albuterol pump at home and PMD followup

## 2017-10-22 NOTE — PROGRESS NOTE ADULT - PROBLEM SELECTOR PLAN 8
Likely 2/2 DKA and stress reaction vs. less likely infection- now resolved.  no indication for antibiotics

## 2017-10-22 NOTE — PROGRESS NOTE ADULT - PROBLEM SELECTOR PLAN 6
resolved, pre-renal due to profound dehydration from DKA, creatinine back to baseline CKD Stage 3 status  - encourage PO intake

## 2017-10-22 NOTE — DISCHARGE NOTE ADULT - PLAN OF CARE
Controlled glucose Diet and activity as noted  Use Humalog insulin according to your scale when pump is not in use  Have your insulin pump checked by its   Use Verio glucometer as directed  Have your HgbA1c checked every 3 months  Return for any worsening symptoms Baseline renal function Diet and activity as noted  Continue your medications as prescribed   Follow up with your primary care physician in one week  Return for any worsening symptoms Controlled blood pressure Diet and activity as noted  Continue medications as prescribed  Follow up with your primary care physician in one week  Return for any worsening symptoms

## 2017-10-22 NOTE — PROGRESS NOTE ADULT - PROBLEM SELECTOR PLAN 4
due to multiple episodes of vomiting with DKA, RVP negative with no URI symptoms. Improved  -Cepacol prn  -will try magic mouthwash and maalox

## 2017-10-22 NOTE — PROGRESS NOTE ADULT - PROBLEM SELECTOR PLAN 1
Resolved. AG closed, FS controlled , no nausea/vomiting.   -was transitioned to insulin pump home dosing setting yesterday and FS have been stable - per discussion with endocrine can be discharged on current setting  - continue monitoring fingersticks  - will send new glucose machine to pharmacy as he machine overreading his fingersticks despite calibration  -outpatient endocrine followup

## 2017-10-22 NOTE — DISCHARGE NOTE ADULT - CARE PLAN
Principal Discharge DX:	DKA (diabetic ketoacidoses)  Goal:	Controlled glucose  Instructions for follow-up, activity and diet:	Diet and activity as noted  Use Humalog insulin according to your scale when pump is not in use  Have your insulin pump checked by its   Use Verio glucometer as directed  Have your HgbA1c checked every 3 months  Return for any worsening symptoms  Secondary Diagnosis:	LEONARD (acute kidney injury)  Goal:	Baseline renal function  Instructions for follow-up, activity and diet:	Diet and activity as noted  Continue your medications as prescribed   Follow up with your primary care physician in one week  Return for any worsening symptoms  Secondary Diagnosis:	HTN (hypertension)  Goal:	Controlled blood pressure  Instructions for follow-up, activity and diet:	Diet and activity as noted  Continue medications as prescribed  Follow up with your primary care physician in one week  Return for any worsening symptoms

## 2017-10-22 NOTE — PROGRESS NOTE ADULT - SUBJECTIVE AND OBJECTIVE BOX
Patient is a 62y old  Male who presents with a chief complaint of Hyperglycemia (17 Oct 2017 14:08)      SUBJECTIVE / OVERNIGHT EVENTS: No o/n events. Feels much better and wants to go home. Throat burning improved but not resolved. no sob or chest pain. Despite calibrating his FS machine - his machine is still registering BG 30-40 higher then hospitals .    MEDICATIONS  (STANDING):  ALBUTerol/ipratropium for Nebulization 3 milliLiter(s) Nebulizer every 6 hours  amLODIPine   Tablet 10 milliGRAM(s) Oral daily  aspirin enteric coated 81 milliGRAM(s) Oral daily  atorvastatin 20 milliGRAM(s) Oral at bedtime  dextrose 5%. 1000 milliLiter(s) (50 mL/Hr) IV Continuous <Continuous>  dextrose 50% Injectable 12.5 Gram(s) IV Push once  dextrose 50% Injectable 25 Gram(s) IV Push once  dextrose 50% Injectable 25 Gram(s) IV Push once  dextrose 50% Injectable 12.5 Gram(s) IV Push once  dextrose 50% Injectable 25 Gram(s) IV Push once  dextrose 50% Injectable 25 Gram(s) IV Push once  enalapril 20 milliGRAM(s) Oral daily  heparin  Injectable 5000 Unit(s) SubCutaneous every 8 hours  insulin lispro (HumaLOG) Pump 1 Each SubCutaneous Continuous Pump  labetalol 100 milliGRAM(s) Oral three times a day  levothyroxine 88 MICROGram(s) Oral daily  pantoprazole    Tablet 40 milliGRAM(s) Oral before breakfast    MEDICATIONS  (PRN):  benzocaine 15 mG/menthol 3.6 mG Lozenge 1 Lozenge Oral four times a day PRN Sore Throat  calcium carbonate 500 mG (Tums) Chewable 1 Tablet(s) Chew four times a day PRN Heartburn  dextrose Gel 1 Dose(s) Oral once PRN Blood Glucose LESS THAN 70 milliGRAM(s)/deciLiter  dextrose Gel 1 Dose(s) Oral once PRN Blood Glucose LESS THAN 70 milliGRAM(s)/deciliter  glucagon  Injectable 1 milliGRAM(s) IntraMuscular once PRN Glucose LESS THAN 70 milligrams/deciliter  glucagon  Injectable 1 milliGRAM(s) IntraMuscular once PRN Glucose <70 milliGRAM(s)/deciLiter      Vital Signs Last 24 Hrs  T(C): 36.9 (22 Oct 2017 09:34), Max: 37.2 (21 Oct 2017 20:23)  T(F): 98.5 (22 Oct 2017 09:34), Max: 98.9 (21 Oct 2017 20:23)  HR: 82 (22 Oct 2017 09:34) (68 - 82)  BP: 129/70 (22 Oct 2017 09:34) (129/70 - 167/68)  BP(mean): --  RR: 18 (22 Oct 2017 09:34) (18 - 18)  SpO2: 99% (22 Oct 2017 09:34) (95% - 99%)  CAPILLARY BLOOD GLUCOSE  119 (22 Oct 2017 08:00)  141 (21 Oct 2017 21:35)  156 (21 Oct 2017 17:02)      POCT Blood Glucose.: 195 mg/dL (22 Oct 2017 09:40)  POCT Blood Glucose.: 119 mg/dL (22 Oct 2017 08:03)  POCT Blood Glucose.: 141 mg/dL (21 Oct 2017 21:35)  POCT Blood Glucose.: 156 mg/dL (21 Oct 2017 17:01)  POCT Blood Glucose.: 206 mg/dL (21 Oct 2017 12:07)    I&O's Summary    21 Oct 2017 07:01  -  22 Oct 2017 07:00  --------------------------------------------------------  IN: 880 mL / OUT: 1450 mL / NET: -570 mL    22 Oct 2017 07:01  -  22 Oct 2017 09:46  --------------------------------------------------------  IN: 0 mL / OUT: 250 mL / NET: -250 mL        PHYSICAL EXAM:  GENERAL: NAD, well-developed  HEAD:  Atraumatic, Normocephalic  Mouth: minimal oropharyngeal erythema no exudates  CHEST/LUNG: faint scattered wheezing but good air entry  HEART: Regular rate and rhythm; No murmurs, rubs, or gallops  ABDOMEN: Soft, Nontender, Nondistended; Bowel sounds present  EXTREMITIES:  2+ Peripheral Pulses, No clubbing, cyanosis, or edema  PSYCH: AAOx3  NEUROLOGY: non-focal  SKIN: No rashes or lesions    LABS:                        11.3   7.48  )-----------( 161      ( 21 Oct 2017 08:48 )             33.6     10-21    144  |  107  |  22  ----------------------------<  97  4.9   |  24  |  1.37<H>    Ca    9.1      21 Oct 2017 08:59        RADIOLOGY & ADDITIONAL TESTS:    Imaging Personally Reviewed:    Consultant(s) Notes Reviewed:   endocrine    Care Discussed with Consultants/Other Providers: endocrine fellow - can discharge home today on current insulin pump settings

## 2017-10-22 NOTE — PROGRESS NOTE ADULT - PROBLEM SELECTOR PLAN 7
elevated  - Continue amlodipine and labetalol  - c/w enalapril 20mg po as BP elevated and LEONARD resolved

## 2017-10-22 NOTE — PROGRESS NOTE ADULT - PROBLEM SELECTOR PLAN 3
c/w insulin pump and dosing per endocrine  FS better controlled   f/u with endocrine regarding insulin pump dosing prior to discharge

## 2017-10-23 PROBLEM — E11.9 TYPE 2 DIABETES MELLITUS WITHOUT COMPLICATIONS: Chronic | Status: ACTIVE | Noted: 2017-10-17

## 2017-10-23 PROBLEM — I10 ESSENTIAL (PRIMARY) HYPERTENSION: Chronic | Status: ACTIVE | Noted: 2017-10-17

## 2017-10-23 PROBLEM — E03.9 HYPOTHYROIDISM, UNSPECIFIED: Chronic | Status: ACTIVE | Noted: 2017-10-17

## 2017-10-24 ENCOUNTER — TRANSCRIPTION ENCOUNTER (OUTPATIENT)
Age: 62
End: 2017-10-24

## 2017-10-24 LAB
CORTICOSTEROID BINDING GLOBULIN RESULT: 2.2 MG/DL — SIGNIFICANT CHANGE UP
CORTIS F/TOTAL MFR SERPL: 41 % — SIGNIFICANT CHANGE UP
CORTIS SERPL-MCNC: 25 UG/DL — SIGNIFICANT CHANGE UP
CORTISOL, FREE RESULT: 10 UG/DL — SIGNIFICANT CHANGE UP

## 2017-10-30 ENCOUNTER — APPOINTMENT (OUTPATIENT)
Dept: INTERNAL MEDICINE | Facility: CLINIC | Age: 62
End: 2017-10-30
Payer: COMMERCIAL

## 2017-10-30 ENCOUNTER — LABORATORY RESULT (OUTPATIENT)
Age: 62
End: 2017-10-30

## 2017-10-30 VITALS
HEIGHT: 73 IN | SYSTOLIC BLOOD PRESSURE: 133 MMHG | OXYGEN SATURATION: 96 % | TEMPERATURE: 97.9 F | DIASTOLIC BLOOD PRESSURE: 72 MMHG | HEART RATE: 66 BPM | WEIGHT: 200 LBS | BODY MASS INDEX: 26.51 KG/M2

## 2017-10-30 DIAGNOSIS — I10 ESSENTIAL (PRIMARY) HYPERTENSION: ICD-10-CM

## 2017-10-30 DIAGNOSIS — E03.9 HYPOTHYROIDISM, UNSPECIFIED: ICD-10-CM

## 2017-10-30 DIAGNOSIS — E10.9 TYPE 1 DIABETES MELLITUS W/OUT COMPLICATIONS: ICD-10-CM

## 2017-10-30 DIAGNOSIS — E13.10 OTHER SPECIFIED DIABETES MELLITUS WITH KETOACIDOSIS W/OUT COMA: ICD-10-CM

## 2017-10-30 DIAGNOSIS — E83.52 HYPERCALCEMIA: ICD-10-CM

## 2017-10-30 PROCEDURE — 90688 IIV4 VACCINE SPLT 0.5 ML IM: CPT

## 2017-10-30 PROCEDURE — 36415 COLL VENOUS BLD VENIPUNCTURE: CPT

## 2017-10-30 PROCEDURE — G0008: CPT

## 2017-10-30 PROCEDURE — 99214 OFFICE O/P EST MOD 30 MIN: CPT | Mod: 25

## 2017-10-31 LAB
24R-OH-CALCIDIOL SERPL-MCNC: 19.6 PG/ML
25(OH)D3 SERPL-MCNC: 32.7 NG/ML
BASOPHILS # BLD AUTO: 0.07 K/UL
BASOPHILS NFR BLD AUTO: 0.8 %
CHOLEST SERPL-MCNC: 148 MG/DL
CHOLEST/HDLC SERPL: 3 RATIO
CK SERPL-CCNC: 296 U/L
EOSINOPHIL # BLD AUTO: 0.27 K/UL
EOSINOPHIL NFR BLD AUTO: 3.2 %
FOLATE SERPL-MCNC: >20 NG/ML
HBA1C MFR BLD HPLC: 7.7 %
HCT VFR BLD CALC: 40.3 %
HDLC SERPL-MCNC: 49 MG/DL
HGB BLD-MCNC: 13.2 G/DL
IMM GRANULOCYTES NFR BLD AUTO: 0.2 %
LDLC SERPL CALC-MCNC: 88 MG/DL
LYMPHOCYTES # BLD AUTO: 2.06 K/UL
LYMPHOCYTES NFR BLD AUTO: 24.5 %
MAN DIFF?: NORMAL
MCHC RBC-ENTMCNC: 31.5 PG
MCHC RBC-ENTMCNC: 32.8 GM/DL
MCV RBC AUTO: 96.2 FL
MONOCYTES # BLD AUTO: 0.73 K/UL
MONOCYTES NFR BLD AUTO: 8.7 %
NEUTROPHILS # BLD AUTO: 5.26 K/UL
NEUTROPHILS NFR BLD AUTO: 62.6 %
PLATELET # BLD AUTO: 476 K/UL
RBC # BLD: 4.19 M/UL
RBC # FLD: 13.7 %
T4 SERPL-MCNC: 8.9 UG/DL
TRIGL SERPL-MCNC: 53 MG/DL
TSH SERPL-ACNC: 2.69 UIU/ML
VIT B12 SERPL-MCNC: 905 PG/ML
WBC # FLD AUTO: 8.41 K/UL

## 2017-11-09 ENCOUNTER — MEDICATION RENEWAL (OUTPATIENT)
Age: 62
End: 2017-11-09

## 2017-11-09 DIAGNOSIS — R21 RASH AND OTHER NONSPECIFIC SKIN ERUPTION: ICD-10-CM

## 2017-11-09 RX ORDER — TRIAMCINOLONE ACETONIDE 1 MG/G
0.1 CREAM TOPICAL DAILY
Qty: 3 | Refills: 3 | Status: ACTIVE | COMMUNITY
Start: 2017-11-09 | End: 1900-01-01

## 2017-11-30 ENCOUNTER — FOLLOW UP (OUTPATIENT)
Dept: URBAN - METROPOLITAN AREA CLINIC 19 | Facility: CLINIC | Age: 62
End: 2017-11-30

## 2017-11-30 DIAGNOSIS — D31.31: ICD-10-CM

## 2017-11-30 DIAGNOSIS — E11.3313: ICD-10-CM

## 2017-11-30 DIAGNOSIS — Z79.4: ICD-10-CM

## 2017-11-30 PROCEDURE — 2021F DILAT MACULAR EXAM DONE: CPT

## 2017-11-30 PROCEDURE — 92226 OPHTHALMOSCOPY (SUB): CPT

## 2017-11-30 PROCEDURE — G8397 DIL MACULA/FUNDUS EXAM/W DOC: HCPCS

## 2017-11-30 PROCEDURE — G8427 DOCREV CUR MEDS BY ELIG CLIN: HCPCS

## 2017-11-30 PROCEDURE — 92014 COMPRE OPH EXAM EST PT 1/>: CPT | Mod: 25

## 2017-11-30 PROCEDURE — 92134 CPTRZ OPH DX IMG PST SGM RTA: CPT

## 2017-11-30 PROCEDURE — 5010F MACUL RESULT PHY/QHP MNG DM: CPT

## 2017-11-30 PROCEDURE — 1036F TOBACCO NON-USER: CPT

## 2017-11-30 PROCEDURE — 2022F DILAT RTA XM EVC RTNOPTHY: CPT

## 2017-11-30 PROCEDURE — 67028 INJECTION EYE DRUG: CPT

## 2017-11-30 ASSESSMENT — VISUAL ACUITY
OD_SC: 20/60
OS_SC: 20/30
OS_PH: 20/25
OD_PH: 20/30-2

## 2017-11-30 ASSESSMENT — TONOMETRY
OS_IOP_MMHG: 15
OD_IOP_MMHG: 16

## 2018-02-14 ENCOUNTER — FOLLOW UP (OUTPATIENT)
Dept: URBAN - METROPOLITAN AREA CLINIC 19 | Facility: CLINIC | Age: 63
End: 2018-02-14

## 2018-02-14 DIAGNOSIS — E11.3313: ICD-10-CM

## 2018-02-14 DIAGNOSIS — Z79.4: ICD-10-CM

## 2018-02-14 DIAGNOSIS — D31.31: ICD-10-CM

## 2018-02-14 PROCEDURE — 2022F DILAT RTA XM EVC RTNOPTHY: CPT

## 2018-02-14 PROCEDURE — 92226 OPHTHALMOSCOPY (SUB): CPT

## 2018-02-14 PROCEDURE — 92014 COMPRE OPH EXAM EST PT 1/>: CPT

## 2018-02-14 PROCEDURE — G8397 DIL MACULA/FUNDUS EXAM/W DOC: HCPCS

## 2018-02-14 PROCEDURE — 92134 CPTRZ OPH DX IMG PST SGM RTA: CPT

## 2018-02-14 PROCEDURE — G8427 DOCREV CUR MEDS BY ELIG CLIN: HCPCS

## 2018-02-14 PROCEDURE — 1036F TOBACCO NON-USER: CPT

## 2018-02-14 PROCEDURE — 5010F MACUL RESULT PHY/QHP MNG DM: CPT

## 2018-02-14 PROCEDURE — 2021F DILAT MACULAR EXAM DONE: CPT

## 2018-02-14 ASSESSMENT — TONOMETRY
OD_IOP_MMHG: 20
OS_IOP_MMHG: 19

## 2018-02-14 ASSESSMENT — VISUAL ACUITY
OS_PH: 20/25
OS_SC: 20/30-1
OD_SC: 20/50-1

## 2018-03-07 NOTE — H&P ADULT - PSH
The ECG revealed a sinus rhythm. The ECG rate was 76 bpm. With frequent PACs  No significant past surgical history

## 2018-03-25 ENCOUNTER — RX RENEWAL (OUTPATIENT)
Age: 63
End: 2018-03-25

## 2018-04-18 ENCOUNTER — FOLLOW UP (OUTPATIENT)
Dept: URBAN - METROPOLITAN AREA CLINIC 19 | Facility: CLINIC | Age: 63
End: 2018-04-18

## 2018-04-18 DIAGNOSIS — E11.3313: ICD-10-CM

## 2018-04-18 DIAGNOSIS — Z79.4: ICD-10-CM

## 2018-04-18 DIAGNOSIS — D31.31: ICD-10-CM

## 2018-04-18 PROCEDURE — 2022F DILAT RTA XM EVC RTNOPTHY: CPT

## 2018-04-18 PROCEDURE — 2021F DILAT MACULAR EXAM DONE: CPT

## 2018-04-18 PROCEDURE — 92134 CPTRZ OPH DX IMG PST SGM RTA: CPT

## 2018-04-18 PROCEDURE — 1036F TOBACCO NON-USER: CPT

## 2018-04-18 PROCEDURE — 92014 COMPRE OPH EXAM EST PT 1/>: CPT

## 2018-04-18 PROCEDURE — 5010F MACUL RESULT PHY/QHP MNG DM: CPT

## 2018-04-18 PROCEDURE — 92226 OPHTHALMOSCOPY (SUB): CPT

## 2018-04-18 PROCEDURE — G8397 DIL MACULA/FUNDUS EXAM/W DOC: HCPCS

## 2018-04-18 PROCEDURE — G8427 DOCREV CUR MEDS BY ELIG CLIN: HCPCS

## 2018-04-18 ASSESSMENT — TONOMETRY
OD_IOP_MMHG: 15
OS_IOP_MMHG: 14

## 2018-04-18 ASSESSMENT — VISUAL ACUITY
OD_SC: 20/60-2
OS_SC: 20/25-2
OD_PH: 20/50-1

## 2018-04-19 ENCOUNTER — RX RENEWAL (OUTPATIENT)
Age: 63
End: 2018-04-19

## 2018-05-09 ENCOUNTER — APPOINTMENT (OUTPATIENT)
Dept: INTERNAL MEDICINE | Facility: CLINIC | Age: 63
End: 2018-05-09

## 2018-06-14 ENCOUNTER — RX RENEWAL (OUTPATIENT)
Age: 63
End: 2018-06-14

## 2018-06-28 ENCOUNTER — FOLLOW UP (OUTPATIENT)
Dept: URBAN - METROPOLITAN AREA CLINIC 19 | Facility: CLINIC | Age: 63
End: 2018-06-28

## 2018-06-28 DIAGNOSIS — E11.3313: ICD-10-CM

## 2018-06-28 DIAGNOSIS — D31.31: ICD-10-CM

## 2018-06-28 DIAGNOSIS — Z79.4: ICD-10-CM

## 2018-06-28 DIAGNOSIS — H25.13: ICD-10-CM

## 2018-06-28 PROCEDURE — G8427 DOCREV CUR MEDS BY ELIG CLIN: HCPCS

## 2018-06-28 PROCEDURE — 2021F DILAT MACULAR EXAM DONE: CPT

## 2018-06-28 PROCEDURE — G8397 DIL MACULA/FUNDUS EXAM/W DOC: HCPCS

## 2018-06-28 PROCEDURE — 92134 CPTRZ OPH DX IMG PST SGM RTA: CPT

## 2018-06-28 PROCEDURE — 2022F DILAT RTA XM EVC RTNOPTHY: CPT

## 2018-06-28 PROCEDURE — 67028 INJECTION EYE DRUG: CPT

## 2018-06-28 PROCEDURE — 1036F TOBACCO NON-USER: CPT

## 2018-06-28 PROCEDURE — 92226 OPHTHALMOSCOPY (SUB): CPT

## 2018-06-28 PROCEDURE — 92014 COMPRE OPH EXAM EST PT 1/>: CPT | Mod: 25

## 2018-06-28 PROCEDURE — 5010F MACUL RESULT PHY/QHP MNG DM: CPT

## 2018-06-28 ASSESSMENT — VISUAL ACUITY
OD_PH: 20/30-1
OS_SC: 20/25-2
OD_SC: 20/50-1

## 2018-06-28 ASSESSMENT — TONOMETRY
OD_IOP_MMHG: 15
OS_IOP_MMHG: 18

## 2018-08-02 ENCOUNTER — FOLLOW UP (OUTPATIENT)
Dept: URBAN - METROPOLITAN AREA CLINIC 19 | Facility: CLINIC | Age: 63
End: 2018-08-02

## 2018-08-02 DIAGNOSIS — E11.3313: ICD-10-CM

## 2018-08-02 DIAGNOSIS — D31.31: ICD-10-CM

## 2018-08-02 DIAGNOSIS — Z79.4: ICD-10-CM

## 2018-08-02 PROCEDURE — 2021F DILAT MACULAR EXAM DONE: CPT

## 2018-08-02 PROCEDURE — 2022F DILAT RTA XM EVC RTNOPTHY: CPT

## 2018-08-02 PROCEDURE — 92014 COMPRE OPH EXAM EST PT 1/>: CPT | Mod: 25

## 2018-08-02 PROCEDURE — 67028 INJECTION EYE DRUG: CPT

## 2018-08-02 PROCEDURE — G8427 DOCREV CUR MEDS BY ELIG CLIN: HCPCS

## 2018-08-02 PROCEDURE — 92134 CPTRZ OPH DX IMG PST SGM RTA: CPT

## 2018-08-02 PROCEDURE — 5010F MACUL RESULT PHY/QHP MNG DM: CPT

## 2018-08-02 PROCEDURE — 92226 OPHTHALMOSCOPY (SUB): CPT

## 2018-08-02 PROCEDURE — 1036F TOBACCO NON-USER: CPT

## 2018-08-02 PROCEDURE — G8397 DIL MACULA/FUNDUS EXAM/W DOC: HCPCS

## 2018-08-02 ASSESSMENT — TONOMETRY
OD_IOP_MMHG: 17
OS_IOP_MMHG: 17

## 2018-08-02 ASSESSMENT — VISUAL ACUITY
OD_PH: 20/30
OD_SC: 20/60
OS_SC: 20/25-1

## 2018-09-06 ENCOUNTER — FOLLOW UP (OUTPATIENT)
Dept: URBAN - METROPOLITAN AREA CLINIC 19 | Facility: CLINIC | Age: 63
End: 2018-09-06

## 2018-09-06 DIAGNOSIS — E11.3313: ICD-10-CM

## 2018-09-06 DIAGNOSIS — Z79.4: ICD-10-CM

## 2018-09-06 PROCEDURE — 2021F DILAT MACULAR EXAM DONE: CPT

## 2018-09-06 PROCEDURE — 92014 COMPRE OPH EXAM EST PT 1/>: CPT | Mod: 25

## 2018-09-06 PROCEDURE — G9903 PT SCRN TBCO ID AS NON USER: HCPCS

## 2018-09-06 PROCEDURE — 1036F TOBACCO NON-USER: CPT

## 2018-09-06 PROCEDURE — 92134 CPTRZ OPH DX IMG PST SGM RTA: CPT

## 2018-09-06 PROCEDURE — G8427 DOCREV CUR MEDS BY ELIG CLIN: HCPCS

## 2018-09-06 PROCEDURE — 92226 OPHTHALMOSCOPY (SUB): CPT

## 2018-09-06 PROCEDURE — 5010F MACUL RESULT PHY/QHP MNG DM: CPT

## 2018-09-06 PROCEDURE — 67028 INJECTION EYE DRUG: CPT

## 2018-09-06 PROCEDURE — 2022F DILAT RTA XM EVC RTNOPTHY: CPT

## 2018-09-06 PROCEDURE — G8397 DIL MACULA/FUNDUS EXAM/W DOC: HCPCS

## 2018-09-06 ASSESSMENT — TONOMETRY
OS_IOP_MMHG: 15
OD_IOP_MMHG: 15

## 2018-09-06 ASSESSMENT — VISUAL ACUITY
OD_PH: 20/40
OS_SC: 20/40
OD_SC: 20/80

## 2018-09-13 ENCOUNTER — FOLLOW UP (OUTPATIENT)
Dept: URBAN - METROPOLITAN AREA CLINIC 19 | Facility: CLINIC | Age: 63
End: 2018-09-13

## 2018-09-13 DIAGNOSIS — Z79.4: ICD-10-CM

## 2018-09-13 DIAGNOSIS — E11.3313: ICD-10-CM

## 2018-09-13 PROCEDURE — 92014 COMPRE OPH EXAM EST PT 1/>: CPT | Mod: 25

## 2018-09-13 PROCEDURE — 5010F MACUL RESULT PHY/QHP MNG DM: CPT

## 2018-09-13 PROCEDURE — 2021F DILAT MACULAR EXAM DONE: CPT

## 2018-09-13 PROCEDURE — 92134 CPTRZ OPH DX IMG PST SGM RTA: CPT

## 2018-09-13 PROCEDURE — 2022F DILAT RTA XM EVC RTNOPTHY: CPT

## 2018-09-13 PROCEDURE — G9903 PT SCRN TBCO ID AS NON USER: HCPCS

## 2018-09-13 PROCEDURE — G8397 DIL MACULA/FUNDUS EXAM/W DOC: HCPCS

## 2018-09-13 PROCEDURE — 67028 INJECTION EYE DRUG: CPT

## 2018-09-13 PROCEDURE — G8427 DOCREV CUR MEDS BY ELIG CLIN: HCPCS

## 2018-09-13 PROCEDURE — 92226 OPHTHALMOSCOPY (SUB): CPT

## 2018-09-13 PROCEDURE — 1036F TOBACCO NON-USER: CPT

## 2018-09-13 ASSESSMENT — TONOMETRY
OD_IOP_MMHG: 17
OS_IOP_MMHG: 18

## 2018-09-13 ASSESSMENT — VISUAL ACUITY
OS_SC: 20/30
OD_SC: 20/60

## 2018-10-11 ENCOUNTER — FOLLOW UP (OUTPATIENT)
Dept: URBAN - METROPOLITAN AREA CLINIC 19 | Facility: CLINIC | Age: 63
End: 2018-10-11

## 2018-10-11 DIAGNOSIS — Z79.4: ICD-10-CM

## 2018-10-11 DIAGNOSIS — E11.3313: ICD-10-CM

## 2018-10-11 DIAGNOSIS — H25.043: ICD-10-CM

## 2018-10-11 PROCEDURE — 92226 OPHTHALMOSCOPY (SUB): CPT

## 2018-10-11 PROCEDURE — G9903 PT SCRN TBCO ID AS NON USER: HCPCS

## 2018-10-11 PROCEDURE — 92134 CPTRZ OPH DX IMG PST SGM RTA: CPT

## 2018-10-11 PROCEDURE — G8427 DOCREV CUR MEDS BY ELIG CLIN: HCPCS

## 2018-10-11 PROCEDURE — 2022F DILAT RTA XM EVC RTNOPTHY: CPT

## 2018-10-11 PROCEDURE — 1036F TOBACCO NON-USER: CPT

## 2018-10-11 PROCEDURE — 92014 COMPRE OPH EXAM EST PT 1/>: CPT | Mod: 25

## 2018-10-11 PROCEDURE — 4040F PNEUMOC VAC/ADMIN/RCVD: CPT | Mod: 8P

## 2018-10-11 PROCEDURE — 5010F MACUL RESULT PHY/QHP MNG DM: CPT

## 2018-10-11 PROCEDURE — 67028 INJECTION EYE DRUG: CPT

## 2018-10-11 PROCEDURE — G8397 DIL MACULA/FUNDUS EXAM/W DOC: HCPCS

## 2018-10-11 PROCEDURE — 2021F DILAT MACULAR EXAM DONE: CPT

## 2018-10-11 ASSESSMENT — VISUAL ACUITY
OD_SC: 20/50-3
OS_PH: 20/25-2
OD_PH: 20/40
OS_SC: 20/25-3

## 2018-10-11 ASSESSMENT — TONOMETRY
OS_IOP_MMHG: 14
OD_IOP_MMHG: 16

## 2018-10-15 ENCOUNTER — RX RENEWAL (OUTPATIENT)
Age: 63
End: 2018-10-15

## 2018-10-18 ENCOUNTER — FOLLOW UP (OUTPATIENT)
Dept: URBAN - METROPOLITAN AREA CLINIC 19 | Facility: CLINIC | Age: 63
End: 2018-10-18

## 2018-10-18 DIAGNOSIS — E11.3313: ICD-10-CM

## 2018-10-18 DIAGNOSIS — Z79.4: ICD-10-CM

## 2018-10-18 DIAGNOSIS — D31.31: ICD-10-CM

## 2018-10-18 PROCEDURE — 4040F PNEUMOC VAC/ADMIN/RCVD: CPT | Mod: 8P

## 2018-10-18 PROCEDURE — 2022F DILAT RTA XM EVC RTNOPTHY: CPT

## 2018-10-18 PROCEDURE — 5010F MACUL RESULT PHY/QHP MNG DM: CPT

## 2018-10-18 PROCEDURE — 1036F TOBACCO NON-USER: CPT

## 2018-10-18 PROCEDURE — 2021F DILAT MACULAR EXAM DONE: CPT

## 2018-10-18 PROCEDURE — G9903 PT SCRN TBCO ID AS NON USER: HCPCS

## 2018-10-18 PROCEDURE — 67028 INJECTION EYE DRUG: CPT

## 2018-10-18 PROCEDURE — 92014 COMPRE OPH EXAM EST PT 1/>: CPT | Mod: 25

## 2018-10-18 PROCEDURE — G8427 DOCREV CUR MEDS BY ELIG CLIN: HCPCS

## 2018-10-18 PROCEDURE — 92226 OPHTHALMOSCOPY (SUB): CPT

## 2018-10-18 PROCEDURE — G8397 DIL MACULA/FUNDUS EXAM/W DOC: HCPCS

## 2018-10-18 PROCEDURE — 92134 CPTRZ OPH DX IMG PST SGM RTA: CPT

## 2018-10-18 ASSESSMENT — TONOMETRY
OD_IOP_MMHG: 17
OS_IOP_MMHG: 18

## 2018-10-18 ASSESSMENT — VISUAL ACUITY
OS_SC: 20/25
OD_PH: 20/30
OD_SC: 20/60+2

## 2018-11-15 ENCOUNTER — FOLLOW UP (OUTPATIENT)
Dept: URBAN - METROPOLITAN AREA CLINIC 19 | Facility: CLINIC | Age: 63
End: 2018-11-15

## 2018-11-15 DIAGNOSIS — D31.31: ICD-10-CM

## 2018-11-15 DIAGNOSIS — Z79.4: ICD-10-CM

## 2018-11-15 DIAGNOSIS — E11.3313: ICD-10-CM

## 2018-11-15 PROCEDURE — 1036F TOBACCO NON-USER: CPT

## 2018-11-15 PROCEDURE — 5010F MACUL RESULT PHY/QHP MNG DM: CPT

## 2018-11-15 PROCEDURE — 67028 INJECTION EYE DRUG: CPT

## 2018-11-15 PROCEDURE — G8427 DOCREV CUR MEDS BY ELIG CLIN: HCPCS

## 2018-11-15 PROCEDURE — 2021F DILAT MACULAR EXAM DONE: CPT

## 2018-11-15 PROCEDURE — G9903 PT SCRN TBCO ID AS NON USER: HCPCS

## 2018-11-15 PROCEDURE — 92014 COMPRE OPH EXAM EST PT 1/>: CPT | Mod: 25

## 2018-11-15 PROCEDURE — 2022F DILAT RTA XM EVC RTNOPTHY: CPT

## 2018-11-15 PROCEDURE — 92134 CPTRZ OPH DX IMG PST SGM RTA: CPT

## 2018-11-15 PROCEDURE — 92226 OPHTHALMOSCOPY (SUB): CPT

## 2018-11-15 PROCEDURE — G8397 DIL MACULA/FUNDUS EXAM/W DOC: HCPCS

## 2018-11-15 ASSESSMENT — VISUAL ACUITY
OS_PH: 20/25
OS_SC: 20/30
OD_PH: 20/30
OD_SC: 10/200

## 2018-11-15 ASSESSMENT — TONOMETRY
OD_IOP_MMHG: 15
OS_IOP_MMHG: 14

## 2018-11-29 ENCOUNTER — FOLLOW UP (OUTPATIENT)
Dept: URBAN - METROPOLITAN AREA CLINIC 19 | Facility: CLINIC | Age: 63
End: 2018-11-29

## 2018-11-29 DIAGNOSIS — D31.31: ICD-10-CM

## 2018-11-29 DIAGNOSIS — Z79.4: ICD-10-CM

## 2018-11-29 DIAGNOSIS — E11.3313: ICD-10-CM

## 2018-11-29 PROCEDURE — 67028 INJECTION EYE DRUG: CPT

## 2018-11-29 PROCEDURE — 92226 OPHTHALMOSCOPY (SUB): CPT

## 2018-11-29 PROCEDURE — G8427 DOCREV CUR MEDS BY ELIG CLIN: HCPCS

## 2018-11-29 PROCEDURE — G8397 DIL MACULA/FUNDUS EXAM/W DOC: HCPCS

## 2018-11-29 PROCEDURE — 1036F TOBACCO NON-USER: CPT

## 2018-11-29 PROCEDURE — G9903 PT SCRN TBCO ID AS NON USER: HCPCS

## 2018-11-29 PROCEDURE — 92134 CPTRZ OPH DX IMG PST SGM RTA: CPT

## 2018-11-29 PROCEDURE — 5010F MACUL RESULT PHY/QHP MNG DM: CPT

## 2018-11-29 PROCEDURE — 2021F DILAT MACULAR EXAM DONE: CPT

## 2018-11-29 PROCEDURE — 2022F DILAT RTA XM EVC RTNOPTHY: CPT

## 2018-11-29 PROCEDURE — 92014 COMPRE OPH EXAM EST PT 1/>: CPT | Mod: 25

## 2018-11-29 ASSESSMENT — VISUAL ACUITY
OD_SC: 20/80-1
OS_SC: 20/40
OD_PH: 20/50

## 2018-11-29 ASSESSMENT — TONOMETRY
OS_IOP_MMHG: 15
OD_IOP_MMHG: 14

## 2018-12-20 ENCOUNTER — FOLLOW UP (OUTPATIENT)
Dept: URBAN - METROPOLITAN AREA CLINIC 19 | Facility: CLINIC | Age: 63
End: 2018-12-20

## 2018-12-20 DIAGNOSIS — D31.31: ICD-10-CM

## 2018-12-20 DIAGNOSIS — E11.3313: ICD-10-CM

## 2018-12-20 DIAGNOSIS — Z79.4: ICD-10-CM

## 2018-12-20 PROCEDURE — 2021F DILAT MACULAR EXAM DONE: CPT

## 2018-12-20 PROCEDURE — G8427 DOCREV CUR MEDS BY ELIG CLIN: HCPCS

## 2018-12-20 PROCEDURE — 67028 INJECTION EYE DRUG: CPT

## 2018-12-20 PROCEDURE — 1036F TOBACCO NON-USER: CPT

## 2018-12-20 PROCEDURE — 2022F DILAT RTA XM EVC RTNOPTHY: CPT

## 2018-12-20 PROCEDURE — G8397 DIL MACULA/FUNDUS EXAM/W DOC: HCPCS

## 2018-12-20 PROCEDURE — 5010F MACUL RESULT PHY/QHP MNG DM: CPT

## 2018-12-20 PROCEDURE — 92226 OPHTHALMOSCOPY (SUB): CPT

## 2018-12-20 PROCEDURE — 92134 CPTRZ OPH DX IMG PST SGM RTA: CPT

## 2018-12-20 PROCEDURE — 92014 COMPRE OPH EXAM EST PT 1/>: CPT | Mod: 25

## 2018-12-20 PROCEDURE — G9903 PT SCRN TBCO ID AS NON USER: HCPCS

## 2018-12-20 ASSESSMENT — VISUAL ACUITY
OD_SC: 20/80
OS_PH: 20/30
OS_SC: 20/40
OD_PH: 20/40

## 2018-12-20 ASSESSMENT — TONOMETRY
OD_IOP_MMHG: 16
OS_IOP_MMHG: 15

## 2019-01-10 ENCOUNTER — FOLLOW UP (OUTPATIENT)
Dept: URBAN - METROPOLITAN AREA CLINIC 19 | Facility: CLINIC | Age: 64
End: 2019-01-10

## 2019-01-10 DIAGNOSIS — D31.31: ICD-10-CM

## 2019-01-10 DIAGNOSIS — E11.3313: ICD-10-CM

## 2019-01-10 DIAGNOSIS — Z79.4: ICD-10-CM

## 2019-01-10 PROCEDURE — 92226 OPHTHALMOSCOPY (SUB): CPT

## 2019-01-10 PROCEDURE — 2022F DILAT RTA XM EVC RTNOPTHY: CPT

## 2019-01-10 PROCEDURE — 92134 CPTRZ OPH DX IMG PST SGM RTA: CPT

## 2019-01-10 PROCEDURE — 1036F TOBACCO NON-USER: CPT

## 2019-01-10 PROCEDURE — G8397 DIL MACULA/FUNDUS EXAM/W DOC: HCPCS

## 2019-01-10 PROCEDURE — G9903 PT SCRN TBCO ID AS NON USER: HCPCS

## 2019-01-10 PROCEDURE — 67028 INJECTION EYE DRUG: CPT

## 2019-01-10 PROCEDURE — 92014 COMPRE OPH EXAM EST PT 1/>: CPT | Mod: 25

## 2019-01-10 PROCEDURE — 5010F MACUL RESULT PHY/QHP MNG DM: CPT

## 2019-01-10 PROCEDURE — 2021F DILAT MACULAR EXAM DONE: CPT

## 2019-01-10 PROCEDURE — G8427 DOCREV CUR MEDS BY ELIG CLIN: HCPCS

## 2019-01-10 ASSESSMENT — VISUAL ACUITY
OS_PH: 20/25
OD_PH: 20/40
OS_SC: 20/40+1
OD_SC: 20/50

## 2019-01-10 ASSESSMENT — TONOMETRY
OD_IOP_MMHG: 15
OS_IOP_MMHG: 17

## 2019-01-24 ENCOUNTER — FOLLOW UP (OUTPATIENT)
Dept: URBAN - METROPOLITAN AREA CLINIC 19 | Facility: CLINIC | Age: 64
End: 2019-01-24

## 2019-01-24 DIAGNOSIS — Z79.4: ICD-10-CM

## 2019-01-24 DIAGNOSIS — D31.31: ICD-10-CM

## 2019-01-24 DIAGNOSIS — E11.3313: ICD-10-CM

## 2019-01-24 PROCEDURE — G8427 DOCREV CUR MEDS BY ELIG CLIN: HCPCS

## 2019-01-24 PROCEDURE — 92134 CPTRZ OPH DX IMG PST SGM RTA: CPT

## 2019-01-24 PROCEDURE — 67210 TREATMENT OF RETINAL LESION: CPT

## 2019-01-24 PROCEDURE — 92226 OPHTHALMOSCOPY (SUB): CPT

## 2019-01-24 PROCEDURE — G8397 DIL MACULA/FUNDUS EXAM/W DOC: HCPCS

## 2019-01-24 PROCEDURE — 2022F DILAT RTA XM EVC RTNOPTHY: CPT

## 2019-01-24 PROCEDURE — 5010F MACUL RESULT PHY/QHP MNG DM: CPT

## 2019-01-24 PROCEDURE — 67028 INJECTION EYE DRUG: CPT

## 2019-01-24 PROCEDURE — 2021F DILAT MACULAR EXAM DONE: CPT

## 2019-01-24 PROCEDURE — G9903 PT SCRN TBCO ID AS NON USER: HCPCS

## 2019-01-24 PROCEDURE — 1036F TOBACCO NON-USER: CPT

## 2019-01-24 PROCEDURE — 92014 COMPRE OPH EXAM EST PT 1/>: CPT | Mod: 25

## 2019-01-24 ASSESSMENT — VISUAL ACUITY
OD_SC: 20/60+3
OS_SC: 20/25
OD_PH: 20/30-2

## 2019-01-24 ASSESSMENT — TONOMETRY
OS_IOP_MMHG: 12
OD_IOP_MMHG: 12

## 2019-02-28 ENCOUNTER — FOLLOW UP (OUTPATIENT)
Dept: URBAN - METROPOLITAN AREA CLINIC 19 | Facility: CLINIC | Age: 64
End: 2019-02-28

## 2019-02-28 DIAGNOSIS — E11.3313: ICD-10-CM

## 2019-02-28 DIAGNOSIS — Z79.4: ICD-10-CM

## 2019-02-28 DIAGNOSIS — D31.31: ICD-10-CM

## 2019-02-28 PROCEDURE — 92226 OPHTHALMOSCOPY (SUB): CPT

## 2019-02-28 PROCEDURE — 92134 CPTRZ OPH DX IMG PST SGM RTA: CPT

## 2019-02-28 PROCEDURE — 67028 INJECTION EYE DRUG: CPT

## 2019-02-28 PROCEDURE — 92014 COMPRE OPH EXAM EST PT 1/>: CPT | Mod: 25

## 2019-02-28 ASSESSMENT — VISUAL ACUITY
OD_PH: 20/30
OS_PH: 20/20+1
OS_SC: 20/25
OD_SC: 20/60+2

## 2019-02-28 ASSESSMENT — TONOMETRY
OD_IOP_MMHG: 15
OS_IOP_MMHG: 14

## 2019-03-10 ENCOUNTER — MEDICATION RENEWAL (OUTPATIENT)
Age: 64
End: 2019-03-10

## 2019-04-25 ENCOUNTER — FOLLOW UP (OUTPATIENT)
Dept: URBAN - METROPOLITAN AREA CLINIC 19 | Facility: CLINIC | Age: 64
End: 2019-04-25

## 2019-04-25 DIAGNOSIS — E11.3313: ICD-10-CM

## 2019-04-25 DIAGNOSIS — D31.31: ICD-10-CM

## 2019-04-25 DIAGNOSIS — Z79.4: ICD-10-CM

## 2019-04-25 PROCEDURE — 67028 INJECTION EYE DRUG: CPT

## 2019-04-25 PROCEDURE — 92134 CPTRZ OPH DX IMG PST SGM RTA: CPT

## 2019-04-25 PROCEDURE — 92226 OPHTHALMOSCOPY (SUB): CPT

## 2019-04-25 PROCEDURE — 92014 COMPRE OPH EXAM EST PT 1/>: CPT | Mod: 25

## 2019-04-25 ASSESSMENT — TONOMETRY
OS_IOP_MMHG: 17
OD_IOP_MMHG: 19

## 2019-04-25 ASSESSMENT — VISUAL ACUITY
OS_PH: 20/25
OD_PH: 20/30
OD_SC: 20/50-1
OS_SC: 20/30

## 2019-05-02 ENCOUNTER — FOLLOW UP (OUTPATIENT)
Dept: URBAN - METROPOLITAN AREA CLINIC 19 | Facility: CLINIC | Age: 64
End: 2019-05-02

## 2019-05-02 DIAGNOSIS — E11.3313: ICD-10-CM

## 2019-05-02 DIAGNOSIS — Z79.4: ICD-10-CM

## 2019-05-02 DIAGNOSIS — D31.31: ICD-10-CM

## 2019-05-02 PROCEDURE — 92014 COMPRE OPH EXAM EST PT 1/>: CPT | Mod: 25

## 2019-05-02 PROCEDURE — 92226 OPHTHALMOSCOPY (SUB): CPT

## 2019-05-02 PROCEDURE — 67028 INJECTION EYE DRUG: CPT

## 2019-05-02 PROCEDURE — 92134 CPTRZ OPH DX IMG PST SGM RTA: CPT

## 2019-05-02 ASSESSMENT — VISUAL ACUITY
OD_SC: 20/60
OS_PH: 20/30+2
OS_SC: 20/30-1
OD_PH: 20/40

## 2019-05-02 ASSESSMENT — TONOMETRY
OD_IOP_MMHG: 15
OS_IOP_MMHG: 15

## 2019-07-11 ENCOUNTER — FOLLOW UP (OUTPATIENT)
Dept: URBAN - METROPOLITAN AREA CLINIC 19 | Facility: CLINIC | Age: 64
End: 2019-07-11

## 2019-07-11 DIAGNOSIS — D31.31: ICD-10-CM

## 2019-07-11 DIAGNOSIS — E11.3313: ICD-10-CM

## 2019-07-11 DIAGNOSIS — H25.813: ICD-10-CM

## 2019-07-11 DIAGNOSIS — Z79.4: ICD-10-CM

## 2019-07-11 DIAGNOSIS — H40.1130: ICD-10-CM

## 2019-07-11 PROCEDURE — 67028 INJECTION EYE DRUG: CPT

## 2019-07-11 PROCEDURE — 92226 OPHTHALMOSCOPY (SUB): CPT

## 2019-07-11 PROCEDURE — 92134 CPTRZ OPH DX IMG PST SGM RTA: CPT

## 2019-07-11 PROCEDURE — 92014 COMPRE OPH EXAM EST PT 1/>: CPT | Mod: 25

## 2019-07-11 ASSESSMENT — VISUAL ACUITY
OD_SC: 20/60-3
OS_SC: 20/30
OD_PH: 20/40

## 2019-07-11 ASSESSMENT — TONOMETRY
OS_IOP_MMHG: 16
OD_IOP_MMHG: 13

## 2019-07-25 ENCOUNTER — FOLLOW UP (OUTPATIENT)
Dept: URBAN - METROPOLITAN AREA CLINIC 19 | Facility: CLINIC | Age: 64
End: 2019-07-25

## 2019-07-25 DIAGNOSIS — Z79.4: ICD-10-CM

## 2019-07-25 DIAGNOSIS — E11.3313: ICD-10-CM

## 2019-07-25 PROCEDURE — 67028 INJECTION EYE DRUG: CPT

## 2019-07-25 ASSESSMENT — VISUAL ACUITY
OD_PH: 20/40
OS_SC: 20/30
OD_SC: 20/60
OS_PH: 20/25

## 2019-07-25 ASSESSMENT — TONOMETRY
OD_IOP_MMHG: 14
OS_IOP_MMHG: 15

## 2019-10-10 ENCOUNTER — FOLLOW UP (OUTPATIENT)
Dept: URBAN - METROPOLITAN AREA CLINIC 19 | Facility: CLINIC | Age: 64
End: 2019-10-10

## 2019-10-10 DIAGNOSIS — E11.3313: ICD-10-CM

## 2019-10-10 DIAGNOSIS — Z79.4: ICD-10-CM

## 2019-10-10 PROCEDURE — 92226 OPHTHALMOSCOPY (SUB): CPT

## 2019-10-10 PROCEDURE — 92014 COMPRE OPH EXAM EST PT 1/>: CPT | Mod: 25

## 2019-10-10 PROCEDURE — 92134 CPTRZ OPH DX IMG PST SGM RTA: CPT

## 2019-10-10 PROCEDURE — 67028 INJECTION EYE DRUG: CPT

## 2019-10-10 ASSESSMENT — TONOMETRY
OS_IOP_MMHG: 17
OD_IOP_MMHG: 18

## 2019-10-10 ASSESSMENT — VISUAL ACUITY
OD_PH: 20/30-1
OS_PH: 20/25
OD_SC: 20/60
OS_SC: 20/40

## 2019-11-21 ENCOUNTER — FOLLOW UP (OUTPATIENT)
Dept: URBAN - METROPOLITAN AREA CLINIC 19 | Facility: CLINIC | Age: 64
End: 2019-11-21

## 2019-11-21 DIAGNOSIS — E11.3313: ICD-10-CM

## 2019-11-21 DIAGNOSIS — Z79.4: ICD-10-CM

## 2019-11-21 PROCEDURE — 92226 OPHTHALMOSCOPY (SUB): CPT

## 2019-11-21 PROCEDURE — 67028 INJECTION EYE DRUG: CPT

## 2019-11-21 PROCEDURE — 92014 COMPRE OPH EXAM EST PT 1/>: CPT | Mod: 25

## 2019-11-21 PROCEDURE — 92134 CPTRZ OPH DX IMG PST SGM RTA: CPT

## 2019-11-21 ASSESSMENT — VISUAL ACUITY
OD_PH: 20/40
OD_SC: 20/60+2
OS_PH: 20/25
OS_SC: 20/30

## 2019-11-21 ASSESSMENT — TONOMETRY
OS_IOP_MMHG: 19
OD_IOP_MMHG: 18

## 2019-12-04 NOTE — ED ADULT NURSE REASSESSMENT NOTE - NS ED NURSE REASSESS COMMENT FT1
finger stick repeated, HIGH on finger stick monitor. Propranolol Pregnancy And Lactation Text: This medication is Pregnancy Category C and it isn't known if it is safe during pregnancy. It is excreted in breast milk.

## 2020-01-02 ENCOUNTER — FOLLOW UP (OUTPATIENT)
Dept: URBAN - METROPOLITAN AREA CLINIC 19 | Facility: CLINIC | Age: 65
End: 2020-01-02

## 2020-01-02 DIAGNOSIS — Z79.4: ICD-10-CM

## 2020-01-02 DIAGNOSIS — E11.3313: ICD-10-CM

## 2020-01-02 PROCEDURE — 67028 INJECTION EYE DRUG: CPT

## 2020-01-02 PROCEDURE — 92014 COMPRE OPH EXAM EST PT 1/>: CPT | Mod: 25

## 2020-01-02 PROCEDURE — 92202 OPSCPY EXTND ON/MAC DRAW: CPT

## 2020-01-02 PROCEDURE — 92134 CPTRZ OPH DX IMG PST SGM RTA: CPT

## 2020-01-02 ASSESSMENT — VISUAL ACUITY
OS_PH: 20/25
OD_SC: 20/50
OS_SC: 20/40
OD_PH: 20/30

## 2020-01-02 ASSESSMENT — TONOMETRY
OD_IOP_MMHG: 15
OS_IOP_MMHG: 16

## 2020-03-05 ENCOUNTER — FOLLOW UP (OUTPATIENT)
Dept: URBAN - METROPOLITAN AREA CLINIC 19 | Facility: CLINIC | Age: 65
End: 2020-03-05

## 2020-03-05 DIAGNOSIS — Z79.4: ICD-10-CM

## 2020-03-05 DIAGNOSIS — E11.3313: ICD-10-CM

## 2020-03-05 PROCEDURE — 92014 COMPRE OPH EXAM EST PT 1/>: CPT | Mod: 25

## 2020-03-05 PROCEDURE — 92134 CPTRZ OPH DX IMG PST SGM RTA: CPT

## 2020-03-05 PROCEDURE — 67028 INJECTION EYE DRUG: CPT

## 2020-03-05 PROCEDURE — 92202 OPSCPY EXTND ON/MAC DRAW: CPT

## 2020-03-05 ASSESSMENT — TONOMETRY
OS_IOP_MMHG: 14
OD_IOP_MMHG: 16

## 2020-03-05 ASSESSMENT — VISUAL ACUITY
OS_SC: 20/30
OD_SC: 20/60
OD_PH: 20/40

## 2020-04-02 ENCOUNTER — FOLLOW UP (OUTPATIENT)
Dept: URBAN - METROPOLITAN AREA CLINIC 19 | Facility: CLINIC | Age: 65
End: 2020-04-02

## 2020-04-02 DIAGNOSIS — H40.1130: ICD-10-CM

## 2020-04-02 DIAGNOSIS — E11.3313: ICD-10-CM

## 2020-04-02 DIAGNOSIS — D31.31: ICD-10-CM

## 2020-04-02 DIAGNOSIS — Z79.4: ICD-10-CM

## 2020-04-02 PROCEDURE — 92014 COMPRE OPH EXAM EST PT 1/>: CPT | Mod: 25

## 2020-04-02 PROCEDURE — 67028 INJECTION EYE DRUG: CPT

## 2020-04-02 PROCEDURE — PFS EYLEA PFS

## 2020-04-02 PROCEDURE — 92202 OPSCPY EXTND ON/MAC DRAW: CPT | Mod: 59

## 2020-04-02 PROCEDURE — 92134 CPTRZ OPH DX IMG PST SGM RTA: CPT

## 2020-04-02 ASSESSMENT — VISUAL ACUITY
OD_SC: 20/60-2
OS_SC: 20/30
OD_PH: 20/30

## 2020-04-02 ASSESSMENT — TONOMETRY
OD_IOP_MMHG: 19
OS_IOP_MMHG: 19

## 2020-06-23 ENCOUNTER — FOLLOW UP (OUTPATIENT)
Dept: URBAN - METROPOLITAN AREA CLINIC 19 | Facility: CLINIC | Age: 65
End: 2020-06-23

## 2020-06-23 DIAGNOSIS — E11.3313: ICD-10-CM

## 2020-06-23 DIAGNOSIS — H40.1130: ICD-10-CM

## 2020-06-23 DIAGNOSIS — D31.31: ICD-10-CM

## 2020-06-23 DIAGNOSIS — Z79.4: ICD-10-CM

## 2020-06-23 PROCEDURE — PFS EYLEA PFS

## 2020-06-23 PROCEDURE — 92202 OPSCPY EXTND ON/MAC DRAW: CPT

## 2020-06-23 PROCEDURE — 92014 COMPRE OPH EXAM EST PT 1/>: CPT

## 2020-06-23 PROCEDURE — 67028 INJECTION EYE DRUG: CPT

## 2020-06-23 PROCEDURE — 92134 CPTRZ OPH DX IMG PST SGM RTA: CPT

## 2020-06-23 ASSESSMENT — TONOMETRY
OD_IOP_MMHG: 18
OS_IOP_MMHG: 19

## 2020-06-23 ASSESSMENT — VISUAL ACUITY
OS_SC: 20/30
OD_SC: 20/80-1
OD_PH: 20/40

## 2020-11-10 ENCOUNTER — FOLLOW UP (OUTPATIENT)
Dept: URBAN - METROPOLITAN AREA CLINIC 19 | Facility: CLINIC | Age: 65
End: 2020-11-10

## 2020-11-10 DIAGNOSIS — H40.1130: ICD-10-CM

## 2020-11-10 DIAGNOSIS — Z79.4: ICD-10-CM

## 2020-11-10 DIAGNOSIS — D31.31: ICD-10-CM

## 2020-11-10 DIAGNOSIS — E11.3313: ICD-10-CM

## 2020-11-10 PROCEDURE — 92014 COMPRE OPH EXAM EST PT 1/>: CPT

## 2020-11-10 PROCEDURE — 92202 OPSCPY EXTND ON/MAC DRAW: CPT

## 2020-11-10 PROCEDURE — 92134 CPTRZ OPH DX IMG PST SGM RTA: CPT

## 2020-11-10 ASSESSMENT — VISUAL ACUITY
OS_SC: 20/25
OD_SC: 20/60
OD_PH: 20/30

## 2020-11-10 ASSESSMENT — TONOMETRY
OD_IOP_MMHG: 14
OS_IOP_MMHG: 16

## 2021-02-23 ENCOUNTER — FOLLOW UP (OUTPATIENT)
Dept: URBAN - METROPOLITAN AREA CLINIC 19 | Facility: CLINIC | Age: 66
End: 2021-02-23

## 2021-02-23 VITALS — HEIGHT: 60 IN

## 2021-02-23 DIAGNOSIS — H40.1130: ICD-10-CM

## 2021-02-23 DIAGNOSIS — Z79.4: ICD-10-CM

## 2021-02-23 DIAGNOSIS — E11.3313: ICD-10-CM

## 2021-02-23 DIAGNOSIS — D31.31: ICD-10-CM

## 2021-02-23 PROCEDURE — 92014 COMPRE OPH EXAM EST PT 1/>: CPT | Mod: 25

## 2021-02-23 PROCEDURE — 92202 OPSCPY EXTND ON/MAC DRAW: CPT

## 2021-02-23 PROCEDURE — PFS EYLEA PFS

## 2021-02-23 PROCEDURE — 67028 INJECTION EYE DRUG: CPT

## 2021-02-23 PROCEDURE — 92134 CPTRZ OPH DX IMG PST SGM RTA: CPT

## 2021-02-23 ASSESSMENT — TONOMETRY
OD_IOP_MMHG: 18
OS_IOP_MMHG: 16

## 2021-02-23 ASSESSMENT — VISUAL ACUITY
OS_SC: 20/25
OD_PH: 20/50-2
OS_PH: 20/20
OD_SC: 20/80

## 2021-04-15 ENCOUNTER — PROBLEM (OUTPATIENT)
Dept: URBAN - METROPOLITAN AREA CLINIC 19 | Facility: CLINIC | Age: 66
End: 2021-04-15

## 2021-04-15 DIAGNOSIS — H43.812: ICD-10-CM

## 2021-04-15 DIAGNOSIS — H43.12: ICD-10-CM

## 2021-04-15 DIAGNOSIS — E11.3412: ICD-10-CM

## 2021-04-15 DIAGNOSIS — H40.1130: ICD-10-CM

## 2021-04-15 DIAGNOSIS — E11.3311: ICD-10-CM

## 2021-04-15 DIAGNOSIS — D31.31: ICD-10-CM

## 2021-04-15 DIAGNOSIS — H43.391: ICD-10-CM

## 2021-04-15 DIAGNOSIS — Z79.4: ICD-10-CM

## 2021-04-15 PROCEDURE — 92202 OPSCPY EXTND ON/MAC DRAW: CPT

## 2021-04-15 PROCEDURE — 67028 INJECTION EYE DRUG: CPT

## 2021-04-15 PROCEDURE — 92250 FUNDUS PHOTOGRAPHY W/I&R: CPT

## 2021-04-15 PROCEDURE — PFS EYLEA PFS

## 2021-04-15 PROCEDURE — 92134 CPTRZ OPH DX IMG PST SGM RTA: CPT

## 2021-04-15 PROCEDURE — 92014 COMPRE OPH EXAM EST PT 1/>: CPT | Mod: 25

## 2021-04-15 PROCEDURE — 92235 FLUORESCEIN ANGRPH MLTIFRAME: CPT

## 2021-04-15 ASSESSMENT — VISUAL ACUITY
OS_PH: 20/25
OS_SC: 20/30
OD_SC: 20/100-2

## 2021-04-15 ASSESSMENT — TONOMETRY
OD_IOP_MMHG: 17
OS_IOP_MMHG: 19
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
## 2021-07-15 ENCOUNTER — FOLLOW UP (OUTPATIENT)
Dept: URBAN - METROPOLITAN AREA CLINIC 19 | Facility: CLINIC | Age: 66
End: 2021-07-15

## 2021-07-15 DIAGNOSIS — E11.3412: ICD-10-CM

## 2021-07-15 DIAGNOSIS — H43.12: ICD-10-CM

## 2021-07-15 DIAGNOSIS — Z79.4: ICD-10-CM

## 2021-07-15 DIAGNOSIS — E11.3311: ICD-10-CM

## 2021-07-15 DIAGNOSIS — H43.812: ICD-10-CM

## 2021-07-15 PROCEDURE — 92134 CPTRZ OPH DX IMG PST SGM RTA: CPT

## 2021-07-15 PROCEDURE — 92014 COMPRE OPH EXAM EST PT 1/>: CPT

## 2021-07-15 PROCEDURE — 92202 OPSCPY EXTND ON/MAC DRAW: CPT

## 2021-07-15 ASSESSMENT — TONOMETRY
OS_IOP_MMHG: 19
OD_IOP_MMHG: 18

## 2021-07-15 ASSESSMENT — VISUAL ACUITY
OD_PH: 20/40
OS_PH: 20/25
OS_SC: 20/30
OD_SC: 20/80-1

## 2021-10-14 ENCOUNTER — FOLLOW UP (OUTPATIENT)
Dept: URBAN - METROPOLITAN AREA CLINIC 19 | Facility: CLINIC | Age: 66
End: 2021-10-14

## 2021-10-14 DIAGNOSIS — H43.391: ICD-10-CM

## 2021-10-14 DIAGNOSIS — E11.3412: ICD-10-CM

## 2021-10-14 DIAGNOSIS — H43.12: ICD-10-CM

## 2021-10-14 DIAGNOSIS — H25.813: ICD-10-CM

## 2021-10-14 DIAGNOSIS — D31.31: ICD-10-CM

## 2021-10-14 DIAGNOSIS — Z79.4: ICD-10-CM

## 2021-10-14 DIAGNOSIS — E11.3311: ICD-10-CM

## 2021-10-14 DIAGNOSIS — H40.1130: ICD-10-CM

## 2021-10-14 DIAGNOSIS — H43.812: ICD-10-CM

## 2021-10-14 PROCEDURE — 92134 CPTRZ OPH DX IMG PST SGM RTA: CPT

## 2021-10-14 PROCEDURE — 92014 COMPRE OPH EXAM EST PT 1/>: CPT

## 2021-10-14 PROCEDURE — 92202 OPSCPY EXTND ON/MAC DRAW: CPT

## 2021-10-14 ASSESSMENT — VISUAL ACUITY
OS_SC: 20/30-1
OD_SC: 20/60-2
OD_PH: 20/50
OS_PH: 20/25

## 2021-10-14 ASSESSMENT — TONOMETRY
OS_IOP_MMHG: 13
OD_IOP_MMHG: 13

## 2022-07-26 ENCOUNTER — FOLLOW UP (OUTPATIENT)
Dept: URBAN - METROPOLITAN AREA CLINIC 19 | Facility: CLINIC | Age: 67
End: 2022-07-26

## 2022-07-26 DIAGNOSIS — D31.31: ICD-10-CM

## 2022-07-26 DIAGNOSIS — E11.3311: ICD-10-CM

## 2022-07-26 DIAGNOSIS — H43.812: ICD-10-CM

## 2022-07-26 DIAGNOSIS — H43.12: ICD-10-CM

## 2022-07-26 DIAGNOSIS — H43.391: ICD-10-CM

## 2022-07-26 DIAGNOSIS — E11.3412: ICD-10-CM

## 2022-07-26 DIAGNOSIS — Z79.4: ICD-10-CM

## 2022-07-26 DIAGNOSIS — H40.1130: ICD-10-CM

## 2022-07-26 DIAGNOSIS — H25.813: ICD-10-CM

## 2022-07-26 PROCEDURE — 92202 OPSCPY EXTND ON/MAC DRAW: CPT

## 2022-07-26 PROCEDURE — 92014 COMPRE OPH EXAM EST PT 1/>: CPT

## 2022-07-26 PROCEDURE — 92134 CPTRZ OPH DX IMG PST SGM RTA: CPT

## 2022-07-26 ASSESSMENT — VISUAL ACUITY
OD_PH: 20/30
OD_SC: 20/60-3
OS_SC: 20/25

## 2022-07-26 ASSESSMENT — TONOMETRY
OD_IOP_MMHG: 22
OS_IOP_MMHG: 11

## 2022-09-21 ENCOUNTER — FOLLOW UP (OUTPATIENT)
Dept: URBAN - METROPOLITAN AREA CLINIC 39 | Facility: CLINIC | Age: 67
End: 2022-09-21

## 2022-09-21 DIAGNOSIS — H43.12: ICD-10-CM

## 2022-09-21 DIAGNOSIS — H40.1130: ICD-10-CM

## 2022-09-21 DIAGNOSIS — H43.812: ICD-10-CM

## 2022-09-21 DIAGNOSIS — Z79.4: ICD-10-CM

## 2022-09-21 DIAGNOSIS — E11.3412: ICD-10-CM

## 2022-09-21 DIAGNOSIS — H25.813: ICD-10-CM

## 2022-09-21 DIAGNOSIS — D31.31: ICD-10-CM

## 2022-09-21 DIAGNOSIS — E11.3311: ICD-10-CM

## 2022-09-21 DIAGNOSIS — H43.391: ICD-10-CM

## 2022-09-21 PROCEDURE — PFS EYLEA PFS

## 2022-09-21 PROCEDURE — 92014 COMPRE OPH EXAM EST PT 1/>: CPT | Mod: 25

## 2022-09-21 PROCEDURE — 92202 OPSCPY EXTND ON/MAC DRAW: CPT | Mod: NC

## 2022-09-21 PROCEDURE — 67028 INJECTION EYE DRUG: CPT

## 2022-09-21 PROCEDURE — 92134 CPTRZ OPH DX IMG PST SGM RTA: CPT

## 2022-09-21 ASSESSMENT — TONOMETRY
OS_IOP_MMHG: 18
OD_IOP_MMHG: 15

## 2022-09-21 ASSESSMENT — VISUAL ACUITY
OD_SC: 20/25-2
OS_SC: 20/25

## 2022-11-02 ENCOUNTER — FOLLOW UP (OUTPATIENT)
Dept: URBAN - METROPOLITAN AREA CLINIC 39 | Facility: CLINIC | Age: 67
End: 2022-11-02

## 2022-11-02 DIAGNOSIS — H43.12: ICD-10-CM

## 2022-11-02 DIAGNOSIS — E11.3311: ICD-10-CM

## 2022-11-02 DIAGNOSIS — Z79.4: ICD-10-CM

## 2022-11-02 DIAGNOSIS — H25.813: ICD-10-CM

## 2022-11-02 DIAGNOSIS — H40.1130: ICD-10-CM

## 2022-11-02 DIAGNOSIS — H43.391: ICD-10-CM

## 2022-11-02 DIAGNOSIS — D31.31: ICD-10-CM

## 2022-11-02 DIAGNOSIS — E11.3412: ICD-10-CM

## 2022-11-02 DIAGNOSIS — H43.812: ICD-10-CM

## 2022-11-02 PROCEDURE — 92134 CPTRZ OPH DX IMG PST SGM RTA: CPT

## 2022-11-02 PROCEDURE — 67028 INJECTION EYE DRUG: CPT

## 2022-11-02 PROCEDURE — 92202 OPSCPY EXTND ON/MAC DRAW: CPT

## 2022-11-02 PROCEDURE — PFS EYLEA PFS

## 2022-11-02 PROCEDURE — 92014 COMPRE OPH EXAM EST PT 1/>: CPT | Mod: 25

## 2022-11-02 ASSESSMENT — VISUAL ACUITY
OS_SC: 20/25-2
OD_SC: 20/25

## 2022-11-02 ASSESSMENT — TONOMETRY
OD_IOP_MMHG: 16
OS_IOP_MMHG: 19

## 2023-02-07 ENCOUNTER — FOLLOW UP (OUTPATIENT)
Dept: URBAN - METROPOLITAN AREA CLINIC 19 | Facility: CLINIC | Age: 68
End: 2023-02-07

## 2023-02-07 DIAGNOSIS — H25.813: ICD-10-CM

## 2023-02-07 DIAGNOSIS — Z79.4: ICD-10-CM

## 2023-02-07 DIAGNOSIS — H43.812: ICD-10-CM

## 2023-02-07 DIAGNOSIS — E11.3413: ICD-10-CM

## 2023-02-07 PROCEDURE — PFS EYLEA PFS

## 2023-02-07 PROCEDURE — 92134 CPTRZ OPH DX IMG PST SGM RTA: CPT

## 2023-02-07 PROCEDURE — 67028 INJECTION EYE DRUG: CPT

## 2023-02-07 PROCEDURE — 92250 FUNDUS PHOTOGRAPHY W/I&R: CPT

## 2023-02-07 PROCEDURE — 92014 COMPRE OPH EXAM EST PT 1/>: CPT | Mod: 25

## 2023-02-07 PROCEDURE — 92202 OPSCPY EXTND ON/MAC DRAW: CPT | Mod: NC

## 2023-02-07 PROCEDURE — 92235 FLUORESCEIN ANGRPH MLTIFRAME: CPT

## 2023-02-07 ASSESSMENT — TONOMETRY
OD_IOP_MMHG: 19
OS_IOP_MMHG: 17

## 2023-02-07 ASSESSMENT — VISUAL ACUITY
OD_SC: 20/25-2
OS_SC: 20/30+3

## 2023-03-30 ENCOUNTER — FOLLOW UP (OUTPATIENT)
Dept: URBAN - METROPOLITAN AREA CLINIC 19 | Facility: CLINIC | Age: 68
End: 2023-03-30

## 2023-03-30 DIAGNOSIS — Z79.4: ICD-10-CM

## 2023-03-30 DIAGNOSIS — H43.812: ICD-10-CM

## 2023-03-30 DIAGNOSIS — E11.3413: ICD-10-CM

## 2023-03-30 DIAGNOSIS — H25.813: ICD-10-CM

## 2023-03-30 PROCEDURE — 92134 CPTRZ OPH DX IMG PST SGM RTA: CPT

## 2023-03-30 PROCEDURE — PFS EYLEA PFS

## 2023-03-30 PROCEDURE — 67028 INJECTION EYE DRUG: CPT

## 2023-03-30 PROCEDURE — 92202 OPSCPY EXTND ON/MAC DRAW: CPT | Mod: NC

## 2023-03-30 PROCEDURE — 92014 COMPRE OPH EXAM EST PT 1/>: CPT | Mod: 25

## 2023-04-04 ASSESSMENT — VISUAL ACUITY
OS_SC: 20/25-1
OD_SC: 20/30-2

## 2023-04-04 ASSESSMENT — TONOMETRY
OD_IOP_MMHG: 19
OS_IOP_MMHG: 19

## 2023-06-22 ENCOUNTER — FOLLOW UP (OUTPATIENT)
Dept: URBAN - METROPOLITAN AREA CLINIC 19 | Facility: CLINIC | Age: 68
End: 2023-06-22

## 2023-06-22 DIAGNOSIS — E11.3413: ICD-10-CM

## 2023-06-22 DIAGNOSIS — Z79.4: ICD-10-CM

## 2023-06-22 PROCEDURE — 92134 CPTRZ OPH DX IMG PST SGM RTA: CPT

## 2023-06-22 PROCEDURE — PFS EYLEA PFS

## 2023-06-22 PROCEDURE — 92014 COMPRE OPH EXAM EST PT 1/>: CPT | Mod: 25

## 2023-06-22 PROCEDURE — 92202 OPSCPY EXTND ON/MAC DRAW: CPT | Mod: NC

## 2023-06-22 PROCEDURE — 67028 INJECTION EYE DRUG: CPT

## 2023-06-22 ASSESSMENT — TONOMETRY
OS_IOP_MMHG: 20
OD_IOP_MMHG: 20

## 2023-06-22 ASSESSMENT — VISUAL ACUITY
OS_SC: 20/25-2
OD_SC: 20/30

## 2023-09-21 ENCOUNTER — FOLLOW UP (OUTPATIENT)
Dept: URBAN - METROPOLITAN AREA CLINIC 19 | Facility: CLINIC | Age: 68
End: 2023-09-21

## 2023-09-21 DIAGNOSIS — E11.3411: ICD-10-CM

## 2023-09-21 DIAGNOSIS — E11.3512: ICD-10-CM

## 2023-09-21 PROCEDURE — 92202 OPSCPY EXTND ON/MAC DRAW: CPT | Mod: NC

## 2023-09-21 PROCEDURE — 67028 INJECTION EYE DRUG: CPT | Mod: 50

## 2023-09-21 PROCEDURE — PFS EYLEA PFS: Mod: JZ

## 2023-09-21 PROCEDURE — 92134 CPTRZ OPH DX IMG PST SGM RTA: CPT

## 2023-09-21 PROCEDURE — 92014 COMPRE OPH EXAM EST PT 1/>: CPT | Mod: 25

## 2023-09-21 ASSESSMENT — TONOMETRY
OS_IOP_MMHG: 15
OD_IOP_MMHG: 14

## 2023-09-21 ASSESSMENT — VISUAL ACUITY
OD_PH: 20/20-2
OD_SC: 20/25-1
OS_SC: 20/20

## 2023-12-14 ENCOUNTER — FOLLOW UP (OUTPATIENT)
Dept: URBAN - METROPOLITAN AREA CLINIC 19 | Facility: CLINIC | Age: 68
End: 2023-12-14

## 2023-12-14 DIAGNOSIS — Z79.4: ICD-10-CM

## 2023-12-14 DIAGNOSIS — E11.3512: ICD-10-CM

## 2023-12-14 DIAGNOSIS — E11.3411: ICD-10-CM

## 2023-12-14 PROCEDURE — 92014 COMPRE OPH EXAM EST PT 1/>: CPT | Mod: 25

## 2023-12-14 PROCEDURE — 67028 INJECTION EYE DRUG: CPT | Mod: 50

## 2023-12-14 PROCEDURE — 92202 OPSCPY EXTND ON/MAC DRAW: CPT | Mod: NC

## 2023-12-14 PROCEDURE — 92134 CPTRZ OPH DX IMG PST SGM RTA: CPT

## 2023-12-14 PROCEDURE — HD EYLEA HD 8MG: Mod: JZ

## 2023-12-14 ASSESSMENT — VISUAL ACUITY
OS_SC: 20/25
OD_SC: 20/30

## 2023-12-14 ASSESSMENT — TONOMETRY
OD_IOP_MMHG: 17
OS_IOP_MMHG: 15

## 2024-02-29 ENCOUNTER — FOLLOW UP (OUTPATIENT)
Dept: URBAN - METROPOLITAN AREA CLINIC 19 | Facility: CLINIC | Age: 69
End: 2024-02-29

## 2024-02-29 DIAGNOSIS — E11.3512: ICD-10-CM

## 2024-02-29 DIAGNOSIS — E11.3411: ICD-10-CM

## 2024-02-29 DIAGNOSIS — D31.31: ICD-10-CM

## 2024-02-29 DIAGNOSIS — H43.812: ICD-10-CM

## 2024-02-29 DIAGNOSIS — H35.373: ICD-10-CM

## 2024-02-29 PROCEDURE — 92202 OPSCPY EXTND ON/MAC DRAW: CPT | Mod: NC

## 2024-02-29 PROCEDURE — 92134 CPTRZ OPH DX IMG PST SGM RTA: CPT

## 2024-02-29 PROCEDURE — 67028 INJECTION EYE DRUG: CPT | Mod: 50

## 2024-02-29 PROCEDURE — 92014 COMPRE OPH EXAM EST PT 1/>: CPT | Mod: 25

## 2024-02-29 PROCEDURE — HD EYLEA HD 8MG: Mod: JZ

## 2024-02-29 ASSESSMENT — VISUAL ACUITY
OS_SC: 20/30
OD_SC: 20/25-1
OS_PH: 20/25
OD_PH: 20/25

## 2024-02-29 ASSESSMENT — TONOMETRY
OD_IOP_MMHG: 18
OS_IOP_MMHG: 17

## 2024-05-16 ENCOUNTER — FOLLOW UP (OUTPATIENT)
Dept: URBAN - METROPOLITAN AREA CLINIC 19 | Facility: CLINIC | Age: 69
End: 2024-05-16

## 2024-05-16 DIAGNOSIS — E11.3512: ICD-10-CM

## 2024-05-16 DIAGNOSIS — E11.3411: ICD-10-CM

## 2024-05-16 PROCEDURE — 67028 INJECTION EYE DRUG: CPT | Mod: 50

## 2024-05-16 PROCEDURE — 92134 CPTRZ OPH DX IMG PST SGM RTA: CPT

## 2024-05-16 PROCEDURE — 92202 OPSCPY EXTND ON/MAC DRAW: CPT | Mod: NC

## 2024-05-16 PROCEDURE — 92014 COMPRE OPH EXAM EST PT 1/>: CPT | Mod: 25

## 2024-05-16 ASSESSMENT — VISUAL ACUITY
OS_SC: 20/25-1
OD_SC: 20/25

## 2024-05-16 ASSESSMENT — TONOMETRY
OD_IOP_MMHG: 20
OS_IOP_MMHG: 19
